# Patient Record
Sex: MALE | Race: WHITE | HISPANIC OR LATINO | Employment: FULL TIME | URBAN - METROPOLITAN AREA
[De-identification: names, ages, dates, MRNs, and addresses within clinical notes are randomized per-mention and may not be internally consistent; named-entity substitution may affect disease eponyms.]

---

## 2023-05-31 LAB
ANION GAP IN SER/PLAS: 10 MMOL/L (ref 10–20)
CALCIUM (MG/DL) IN SER/PLAS: 9.4 MG/DL (ref 8.6–10.3)
CARBON DIOXIDE, TOTAL (MMOL/L) IN SER/PLAS: 29 MMOL/L (ref 21–32)
CHLORIDE (MMOL/L) IN SER/PLAS: 105 MMOL/L (ref 98–107)
CREATININE (MG/DL) IN SER/PLAS: 1.15 MG/DL (ref 0.5–1.3)
GFR MALE: 71 ML/MIN/1.73M2
GLUCOSE (MG/DL) IN SER/PLAS: 117 MG/DL (ref 74–99)
MAGNESIUM (MG/DL) IN SER/PLAS: 2.06 MG/DL (ref 1.6–2.4)
NATRIURETIC PEPTIDE B (PG/ML) IN SER/PLAS: 39 PG/ML (ref 0–99)
POTASSIUM (MMOL/L) IN SER/PLAS: 4.1 MMOL/L (ref 3.5–5.3)
SODIUM (MMOL/L) IN SER/PLAS: 140 MMOL/L (ref 136–145)
UREA NITROGEN (MG/DL) IN SER/PLAS: 27 MG/DL (ref 6–23)

## 2023-08-17 PROBLEM — I25.10 CAD IN NATIVE ARTERY: Status: ACTIVE | Noted: 2023-08-17

## 2023-08-17 PROBLEM — E11.9 DIABETES MELLITUS (MULTI): Status: ACTIVE | Noted: 2023-08-17

## 2023-08-17 PROBLEM — E78.5 DYSLIPIDEMIA: Status: ACTIVE | Noted: 2023-08-17

## 2023-08-17 PROBLEM — M25.662 DECREASED RANGE OF MOTION OF LEFT KNEE: Status: ACTIVE | Noted: 2023-08-17

## 2023-08-17 PROBLEM — I50.42 CHRONIC COMBINED SYSTOLIC AND DIASTOLIC HEART FAILURE, NYHA CLASS 3 (MULTI): Status: ACTIVE | Noted: 2023-08-17

## 2023-08-17 PROBLEM — I10 HTN (HYPERTENSION), BENIGN: Status: ACTIVE | Noted: 2023-08-17

## 2023-08-17 PROBLEM — Z95.810 ICD (IMPLANTABLE CARDIOVERTER-DEFIBRILLATOR) IN PLACE: Status: ACTIVE | Noted: 2023-08-17

## 2023-08-17 PROBLEM — R29.898 DECREASED STRENGTH INVOLVING KNEE JOINT: Status: ACTIVE | Noted: 2023-08-17

## 2023-08-17 RX ORDER — TRAZODONE HYDROCHLORIDE 50 MG/1
1 TABLET ORAL NIGHTLY
COMMUNITY
Start: 2020-11-24

## 2023-08-17 RX ORDER — HYDROCODONE BITARTRATE AND ACETAMINOPHEN 5; 300 MG/1; MG/1
1-2 TABLET ORAL
COMMUNITY
Start: 2021-06-11

## 2023-08-17 RX ORDER — OMEPRAZOLE 40 MG/1
1 CAPSULE, DELAYED RELEASE ORAL DAILY
COMMUNITY
Start: 2021-06-11

## 2023-08-17 RX ORDER — FUROSEMIDE 40 MG/1
1 TABLET ORAL DAILY
COMMUNITY
Start: 2021-06-11 | End: 2023-11-01

## 2023-08-17 RX ORDER — CARVEDILOL 12.5 MG/1
1.5 TABLET ORAL 2 TIMES DAILY
COMMUNITY
Start: 2021-06-11 | End: 2024-03-26

## 2023-08-17 RX ORDER — SILDENAFIL 100 MG/1
1 TABLET, FILM COATED ORAL AS NEEDED
COMMUNITY
Start: 2020-08-17

## 2023-08-17 RX ORDER — HYDROCODONE BITARTRATE AND ACETAMINOPHEN 5; 300 MG/1; MG/1
1 TABLET ORAL EVERY 8 HOURS
COMMUNITY

## 2023-08-17 RX ORDER — TERBINAFINE HYDROCHLORIDE 250 MG/1
1 TABLET ORAL DAILY
COMMUNITY
Start: 2021-06-11

## 2023-08-17 RX ORDER — METFORMIN HYDROCHLORIDE 500 MG/1
1 TABLET ORAL 2 TIMES DAILY
COMMUNITY
Start: 2022-03-18 | End: 2024-05-03 | Stop reason: WASHOUT

## 2023-08-17 RX ORDER — GLIMEPIRIDE 4 MG/1
1 TABLET ORAL DAILY
COMMUNITY
Start: 2021-06-11 | End: 2024-05-03 | Stop reason: WASHOUT

## 2023-08-17 RX ORDER — KETOCONAZOLE 20 MG/G
1 CREAM TOPICAL 2 TIMES DAILY
COMMUNITY
Start: 2021-05-04

## 2023-08-17 RX ORDER — SACUBITRIL AND VALSARTAN 49; 51 MG/1; MG/1
1 TABLET, FILM COATED ORAL 2 TIMES DAILY
COMMUNITY
Start: 2021-06-11 | End: 2024-01-24

## 2023-08-17 RX ORDER — ATORVASTATIN CALCIUM 80 MG/1
1 TABLET, FILM COATED ORAL DAILY
COMMUNITY
Start: 2021-06-11

## 2023-08-17 RX ORDER — BLOOD SUGAR DIAGNOSTIC
STRIP MISCELLANEOUS
COMMUNITY
Start: 2021-01-21

## 2023-08-17 RX ORDER — ASPIRIN 81 MG/1
1 TABLET ORAL DAILY
COMMUNITY
Start: 2021-06-11

## 2023-08-17 RX ORDER — CICLOPIROX 80 MG/ML
1 SOLUTION TOPICAL DAILY
COMMUNITY
Start: 2021-05-04

## 2023-08-17 RX ORDER — SPIRONOLACTONE 25 MG/1
1 TABLET ORAL DAILY
COMMUNITY
Start: 2021-06-11 | End: 2023-11-03

## 2023-10-06 ENCOUNTER — TELEPHONE (OUTPATIENT)
Dept: CARDIOLOGY | Facility: CLINIC | Age: 63
End: 2023-10-06
Payer: COMMERCIAL

## 2023-10-06 NOTE — TELEPHONE ENCOUNTER
Prosper Villafuerte was scheduled for an echo on 10/16, this has been denied by insurance and [facility]  [phone]  case #[id number] was also Denied.   I called patient and left him a VM to let him know the situation, as well as canceled the Echo.

## 2023-10-18 ENCOUNTER — HOSPITAL ENCOUNTER (OUTPATIENT)
Dept: CARDIOLOGY | Facility: HOSPITAL | Age: 63
Discharge: HOME | End: 2023-10-18
Payer: COMMERCIAL

## 2023-10-18 DIAGNOSIS — Z95.810 PRESENCE OF AUTOMATIC (IMPLANTABLE) CARDIAC DEFIBRILLATOR: ICD-10-CM

## 2023-10-18 DIAGNOSIS — Z95.810 PRESENCE OF AUTOMATIC (IMPLANTABLE) CARDIAC DEFIBRILLATOR: Primary | ICD-10-CM

## 2023-10-18 DIAGNOSIS — I50.1 LEFT VENTRICULAR FAILURE, UNSPECIFIED (MULTI): ICD-10-CM

## 2023-10-18 PROCEDURE — 93296 REM INTERROG EVL PM/IDS: CPT

## 2023-10-23 DIAGNOSIS — I50.42 CHRONIC COMBINED SYSTOLIC AND DIASTOLIC HEART FAILURE, NYHA CLASS 3 (MULTI): Primary | ICD-10-CM

## 2023-10-28 NOTE — PROGRESS NOTES
[facility]    Patient Name: Prosper Villafuerte  Patient : 1960      Scribe Attestation  By signing my name below, I, Angelika Hawk   attest that this documentation has been prepared under the direction and in the presence of Christian Montano DO.       Reason for visit:  This is a 62-year-old male here for follow-up regarding his history chronic systolic and diastolic heart failure with an ejection fraction of 30 to 35% now improved to 55%, ICD, moderate nonobstructive coronary artery disease as seen on cardiac catheterization done in 2021, hypertension, dyslipidemia, diabetes mellitus.      HPI:  This is a 62-year-old male here for follow-up regarding his history of chronic systolic and diastolic heart failure with an ejection fraction of 30 to 35% now improved to 55%, ICD, moderate nonobstructive coronary artery disease as seen on cardiac catheterization done in 2021, hypertension, dyslipidemia, diabetes mellitus.  The patient was last evaluated by me in May 2023.  At that visit I ordered blood work including BMP/BNP/magnesium, ordered additional blood work including CMP/lipid/magnesium/CBC/BNP to be drawn in 6 months, ordered an echocardiogram to be completed in 6 months, and asked the patient to follow-up in 6 months and sooner if necessary.  BMP done in May 2023 showed normal serum sodium and potassium with a serum creatinine of 1.15, serum magnesium was 2.06, BNP was 39.  Hemoglobin A1c done in 2023 was 7.1%.  Echocardiogram done on 10/31/23 showed low normal left ventricular systolic function with a 55% estimated ejection fraction. ECG done today showed sinus rhythm with a heart rate of 67 bpm.  The patient reports fatigue but denies any chest pain, shortness of breath, palpitations or lightheadedness. He states that he does not monitor his blood pressure at home. He states that he recently had blood works but results are not available for  review at this time. He states that he takes all of his medications as prescribed. During my exam, he was resting comfortably on the exam table.             Assessment/Plan:   1. History of chronic systolic and diastolic heart failure  The patient has a history of chronic systolic and diastolic heart failure with an ejection fraction of 30 to 35% now improved to 55%.  Echocardiogram done on 10/31/23 showed low normal left ventricular systolic function with a 55% estimated ejection fraction.  He does not appear significantly volume overloaded on exam today.  He should continue his current cardiac medications.  Recent lab works as noted in the HPI.   Lab works as noted below will be done in 6 months prior to his next visit.  I discussed with him the importance of following a low-sodium heart healthy diet as well as weight loss.   Follow up in 6 months and sooner if necessary.      2. ICD  The patient has an ICD implanted in November 2021.  He should continue to follow with the device clinic.     3. Coronary artery disease  The patient has a history of moderate nonobstructive coronary artery disease as seen on cardiac catheterization done in June 2021.  ECG done today showed sinus rhythm with a heart rate of 67 bpm.    He denies anginal chest discomfort.  Blood pressure is decreased to 88/62 on exam today.  I will discontinue spironolactone and decrease furosemide to 20 mg daily.   He should continue his other antihypertensive medications and antiplatelet therapy.  Echocardiogram done on 10/31/23 showed low normal left ventricular systolic function with a 55% estimated ejection fraction.  Lipid panel done in November 2022 showed an LDL cholesterol 55 and triglycerides of 164 while on atorvastatin 80 mg daily.   Recent lab works as noted in the HPI. We ill try to obtain result of blood works done recently.  Lab works as noted below will be done in 6 months prior to his next visit.  Please see lifestyle recommendations  below.  Follow up in 6 months and sooner if necessary.      4. Hypertension  The patient has a history of hypertension and his blood pressure is decreased to 88/62 on exam today.  He denies dizziness or lightheadedness.   I will discontinue spironolactone and decrease furosemide to 20 mg daily.   He should continue his other antihypertensive medications.     5. Dyslipidemia  Lipid panel done in November 2022 showed an LDL cholesterol 55 and triglycerides of 164 while on atorvastatin 80 mg daily.   I will update lipid panel as noted below.  Please see lifestyle recommendations below.     6. Diabetes mellitus  Hemoglobin A1c done in July 2023 was 7.1%.   Medication management per primary care provider.    7. Fatigue  The patient reports fatigue as noted in the HPI.  His fatigue could be due to low blood pressure. Blood pressure at the clinic today was 88/62.   I will discontinue spironolactone and decrease furosemide to 20 mg daily.   He should continue his other antihypertensive medications.  He should monitor his blood pressure at home.      Orders:   Discontinue spironolactone and decrease furosemide to 20 mg daily.   Obtain results of recent blood works.  BMP/magnesium in 6 months,   Follow-up in 6 months.         Lifestyle Recommendations  I recommend a whole-food plant-based diet, an eating pattern that encourages the consumption of unrefined plant foods (such as fruits, vegetables, tubers, whole grains, legumes, nuts and seeds) and discourages meats, dairy products, eggs and processed foods.     The AHA/ACC recommends that the patient consume a dietary pattern that emphasizes intake of vegetables, fruits, and whole grains; includes low-fat dairy products, poultry, fish, legumes, non-tropical vegetable oils, and nuts; and limits intake of sodium, sweets, sugar-sweetened beverages, and red meats.  Adapt this dietary pattern to appropriate calorie requirements (a 500-750 kcal/day deficit to loose weight), personal  and cultural food preferences, and nutrition therapy for other medical conditions (including diabetes).  Achieve this pattern by following plans such as the [place] Mediterranean, DASH dietary pattern, or AHA diet.     Engage in 2 hours and 30 minutes per week of moderate-intensity physical activity, or 1 hour and 15 minutes (75 minutes) per week of vigorous-intensity aerobic physical activity, or an equivalent combination of moderate and vigorous-intensity aerobic physical activity. Aerobic activity should be performed in episodes of at least 10 minutes preferably spread throughout the week.     Adhering to a heart healthy diet, regular exercise habits, avoidance of tobacco products, and maintenance of a healthy weight are crucial components of their heart disease risk reduction.     Any positive review of systems not specifically addressed in the office visit today should be evaluated and treated by the patients primary care physician or in an emergency department if necessary     Patient was notified that results from ordered tests will be called to the patient if it changes current management; it will otherwise be discussed at a future appointment and available on  [facility].     Thank you for allowing me to participate in the care of this patient.        This document was generated using the assistance of voice recognition software. If there are any errors of spelling, grammar, syntax, or meaning; please feel free to contact me directly for clarification.    Past Medical History:  He has no past medical history on file.    Past Surgical History:  He has a past surgical history that includes Other surgical history (06/11/2021).      Social History:  He has no history on file for tobacco use, alcohol use, and drug use.    Family History:  Family History   Problem Relation Name Age of Onset    No Known Problems Mother      No Known Problems Father          Allergies:  Patient has no known allergies.    Outpatient  "Medications:  Current Outpatient Medications   Medication Instructions    aspirin 81 mg EC tablet 1 tablet, oral, Daily    atorvastatin (Lipitor) 80 mg tablet 1 tablet, oral, Daily    carvedilol (Coreg) 12.5 mg tablet 1.5 tablets, oral, 2 times daily    ciclopirox (Penlac) 8 % solution 1 Application, Topical, Daily, nails    empagliflozin (Jardiance) 10 mg 1 tablet, oral, Daily    furosemide (Lasix) 40 mg tablet 1 tablet, oral, Daily    glimepiride (Amaryl) 4 mg tablet 1 tablet, oral, Daily    HYDROcodone-acetaminophen (Vicodin) 5-300 mg tablet 1-2 tablets, oral, 4 TO 6 HOURS AS NEEDED FOR PAIN.    HYDROcodone-acetaminophen (Vicodin) 5-300 mg tablet 1 tablet, oral, Every 8 hours    ketoconazole (NIZOral) 2 % cream 1 Application, Topical, 2 times daily    metFORMIN (Glucophage) 500 mg tablet 1 tablet, oral, 2 times daily    omeprazole (PriLOSEC) 40 mg DR capsule 1 capsule, oral, Daily    OneTouch Ultra Test strip use to test blood sugar twice a day as instructed    sacubitriL-valsartan (Entresto) 49-51 mg tablet 1 tablet, oral, 2 times daily    sildenafil (Viagra) 100 mg tablet 1 tablet, oral, As needed, 30 TO 60 MINUTES BEFORE SEXUAL INTERCOURSE    spironolactone (Aldactone) 25 mg tablet 1 tablet, oral, Daily    terbinafine (LamISIL) 250 mg tablet 1 tablet, oral, Daily    traZODone (Desyrel) 50 mg tablet 1 tablet, oral, Nightly        ROS:  A 14 point review of systems was done and is negative other than as stated in HPI    Vitals:      7/30/2021    10:56 AM 9/17/2021    10:56 AM 10/25/2021     8:30 AM 1/10/2022     9:21 AM 3/18/2022    11:24 AM 11/4/2022     2:23 PM 5/31/2023     1:57 PM   Vitals   Systolic 122 108 132 120 124 111 132   Diastolic 64 62 76 60 64 71 78   Heart Rate 86 78 78 84 68 74 57   Resp 18 18 16 16 16 16    Height (in) 1.803 m (5' 11\") 1.803 m (5' 11\") 1.803 m (5' 11\") 1.803 m (5' 11\") 1.803 m (5' 11\") 1.803 m (5' 11\") 1.803 m (5' 11\")   Weight (lb) 211 211.38 207 216.56 212 214 214   BMI 29.43 " kg/m2 29.48 kg/m2 28.87 kg/m2 30.2 kg/m2 29.57 kg/m2 29.85 kg/m2 29.85 kg/m2   BSA (m2) 2.19 m2 2.19 m2 2.17 m2 2.22 m2 2.2 m2 2.21 m2 2.21 m2        Physical Exam:     Constitutional: Cooperative, in no acute distress, alert, appears stated age.  Skin: Skin color, texture, turgor normal. No rashes or lesions.  Head: Normocephalic. No masses, lesions, tenderness or abnormalities  Eyes: Extraocular movements are grossly intact.  Mouth and throat: Mucous membranes moist  Neck: Neck supple, no carotid bruits, no JVD  Respiratory: Lungs clear to auscultation, no wheezing or rhonchi, no use of accessory muscles  Chest wall: ICD, normal excursion with respiration  Cardiovascular: Regular rhythm without murmur, gallop, or rubs  Gastrointestinal: Abdomen soft, nontender. Bowel sounds normal.  Musculoskeletal: Strength equal in upper extremities  Extremities: No pitting edema  Neurologic: Sensation grossly intact, alert and oriented x3    Intake/Output:   No intake/output data recorded.    Outpatient Medications  Current Outpatient Medications on File Prior to Visit   Medication Sig Dispense Refill    aspirin 81 mg EC tablet Take 1 tablet (81 mg) by mouth once daily.      atorvastatin (Lipitor) 80 mg tablet Take 1 tablet (80 mg) by mouth once daily.      carvedilol (Coreg) 12.5 mg tablet Take 1.5 tablets (18.75 mg) by mouth twice a day.      ciclopirox (Penlac) 8 % solution Apply 1 Application topically once daily. nails      empagliflozin (Jardiance) 10 mg Take 1 tablet (10 mg) by mouth once daily.      furosemide (Lasix) 40 mg tablet Take 1 tablet (40 mg) by mouth once daily.      glimepiride (Amaryl) 4 mg tablet Take 1 tablet (4 mg) by mouth once daily.      HYDROcodone-acetaminophen (Vicodin) 5-300 mg tablet Take 1-2 tablets by mouth. 4 TO 6 HOURS AS NEEDED FOR PAIN.      HYDROcodone-acetaminophen (Vicodin) 5-300 mg tablet Take 1 tablet by mouth every 8 hours.      ketoconazole (NIZOral) 2 % cream Apply 1 Application  topically 2 times a day.      metFORMIN (Glucophage) 500 mg tablet Take 1 tablet (500 mg) by mouth 2 times a day.      omeprazole (PriLOSEC) 40 mg DR capsule Take 1 capsule (40 mg) by mouth once daily.      OneTouch Ultra Test strip use to test blood sugar twice a day as instructed      sacubitriL-valsartan (Entresto) 49-51 mg tablet Take 1 tablet by mouth 2 times a day.      sildenafil (Viagra) 100 mg tablet Take 1 tablet (100 mg) by mouth if needed. 30 TO 60 MINUTES BEFORE SEXUAL INTERCOURSE      spironolactone (Aldactone) 25 mg tablet Take 1 tablet (25 mg) by mouth once daily.      terbinafine (LamISIL) 250 mg tablet Take 1 tablet (250 mg) by mouth once daily.      traZODone (Desyrel) 50 mg tablet Take 1 tablet (50 mg) by mouth once daily at bedtime.       No current facility-administered medications on file prior to visit.       Labs: (past 26 weeks)  Recent Results (from the past 4368 hour(s))   B-Type Natriuretic Peptide    Collection Time: 05/31/23  2:41 PM   Result Value Ref Range    BNP 39 0 - 99 pg/mL   Basic Metabolic Panel    Collection Time: 05/31/23  2:41 PM   Result Value Ref Range    Glucose 117 (H) 74 - 99 mg/dL    Sodium 140 136 - 145 mmol/L    Potassium 4.1 3.5 - 5.3 mmol/L    Chloride 105 98 - 107 mmol/L    Bicarbonate 29 21 - 32 mmol/L    Anion Gap 10 10 - 20 mmol/L    Urea Nitrogen 27 (H) 6 - 23 mg/dL    Creatinine 1.15 0.50 - 1.30 mg/dL    GFR MALE 71 >90 mL/min/1.73m2    Calcium 9.4 8.6 - 10.3 mg/dL   Magnesium    Collection Time: 05/31/23  2:41 PM   Result Value Ref Range    Magnesium 2.06 1.60 - 2.40 mg/dL   POCT GLYCOSYLATED HEMOGLOBIN (HGB A1C)    Collection Time: 07/20/23 10:30 AM   Result Value Ref Range    POCT Hemoglobin A1C 7.1 (A) 4.2 - 5.6 %       ECG  No results found for this or any previous visit (from the past 4464 hour(s)).    Echocardiogram  No results found for this or any previous visit from the past 1095 days.      CV Studies:  EKG:No results found for this or any previous  visit (from the past 4464 hour(s)).  Echocardiogram:   Echocardiogram     Narrative  [address] [address]  Phone [phone] Fax [phone]    TRANSTHORACIC ECHOCARDIOGRAM REPORT      Patient Name:     EMMETT JOHNSONEGO Reading Physician:   [id number] Johnson Oh MD  Study Date:       9/3/2021        Referring Physician: [id number] WADE  MRN/PID:          [id number]        PCP:  Accession/Order#: [id number]    Department Location: [facility] Echo Lab  YOB: 1960       Fellow:  Gender:           M               Nurse:  Admit Date:                       Sonographer:         Lucero Guillory RDCS  Admission Status: Outpatient      Additional Staff:  Height:           180.34 cm       CC Report to:  Weight:           95.71 kg        Study Type:          Echocardiogram  BSA:              2.16 m2  Blood Pressure: 112 /64 mmHg    Diagnosis/ICD: I25.10-Atherosclerotic heart disease of native coronary artery  without angina pectoris; I50.42-Chronic combined systolic  (congestive) and diastolic (congestive) heart failure (CHF);  I10-Essential (primary) hypertension  Indication:    Limited echo for LVF  Procedure/CPT: Echo Limited-20454; Doppler Limited-36475  Study Detail: The following Echo studies were performed: 2D and Doppler.      PHYSICIAN INTERPRETATION:  Left Ventricle: The left ventricular systolic function is moderately decreased, with an estimated ejection fraction of 30-35%. There is global hypokinesis of the left ventricle with minor regional variations. The left ventricular cavity size is not assessed. Left ventricular diastolic filling was not assessed.  Left Atrium: The left atrium was not assessed.  Right Ventricle: The right ventricle was not assessed. Right ventricular systolic function not assessed.  Right Atrium: The right atrium was not assessed.  Aortic Valve: The aortic valve was not assessed. Aortic valve regurgitation was not  assessed.  Mitral Valve: The mitral valve was not assessed. Mitral valve regurgitation was not assessed.  Tricuspid Valve: The tricuspid valve was not assessed. Tricuspid regurgitation was not assessed.  Pulmonic Valve: The pulmonic valve was not assessed. The pulmonic valve regurgitation was not assessed.  Pericardium: Pericardial effusion was not assessed.  Aorta: The aortic root was not assessed.      CONCLUSIONS:  1. The left ventricular systolic function is moderately decreased with a 30-35% estimated ejection fraction.  2. There is global hypokinesis of the left ventricle with minor regional variations.    QUANTITATIVE DATA SUMMARY:  2D MEASUREMENTS:  Normal Ranges:  IVSd:          1.20 cm    (0.6-1.1cm)  LVPWd:         1.20 cm    (0.6-1.1cm)  LVIDd:         6.00 cm    (3.9-5.9cm)  LVIDs:         5.20 cm  LV Mass Index: 145.6 g/m2  LV % FS        13.3 %    LA VOLUME:  Normal Ranges:  LA Vol A4C:        67.5 ml   (22+/-6mL/m2)  LA Vol Index A4C:  31.3ml/m2  LA Area A4C:       20.9 cm2  LA Major Axis A4C: 5.5 cm  LA Vol A4C:        68.0 ml    RA VOLUME BY A/L METHOD:  Normal Ranges:  RA Area A4C: 13.4 cm2    LV SYSTOLIC FUNCTION BY 2D PLANIMETRY (MOD):  Normal Ranges:  EF-A4C View: 35.5 % (>55%)  EF-A2C View: 36.4 %  EF-Biplane:  36.1 %    LV DIASTOLIC FUNCTION:  Normal Ranges:  MV Peak E:        0.51 m/s    (0.7-1.2 m/s)  MV Peak A:        0.90 m/s    (0.42-0.7 m/s)  E/A Ratio:        0.57        (1.0-2.2)  MV e'             0.06 m/s    (>8.0)  MV lateral e'     0.07 m/s  MV medial e'      0.05 m/s  MV A Dur:         124.00 msec  E/e' Ratio:       8.55        (<8.0)  LV IVRT:          106 msec    (<100ms)  PulmV A Revs Dur: 132.00 msec    MITRAL VALVE:  Normal Ranges:  MV DT: 203 msec (150-240msec)    AORTIC VALVE:  Normal Ranges:  LVOT Max Jorge:  0.71 m/s (<1.1m/s)  LVOT VTI:      16.10 cm  LVOT Diameter: 2.20 cm  (1.8-2.4cm)    RIGHT VENTRICLE:  RV 1 3.2 cm  RV 2 1.7 cm  RV 3 9.0 cm    TRICUSPID  VALVE/RVSP:  Normal Ranges:  IVC Diam: 1.48 cm    Pulmonary Veins:  PulmV A Revs Dur: 132.00 msec      [id number] Johnson Oh MD  Electronically signed on 9/4/2021 at 8:58:48 AM         Final     Stress Testing IMVERONICA([id number]:1:1825):   NM CARDIAC STRESS REST (MYOCARDIAL PERFUSION MIBI) 05/31/2021    Narrative  MRN: [id number]  Patient Name: EMMETT ALTAMIRANO    STUDY:  CARDIAC STRESS/REST INJECTION; PART 2 STRESS OR REST (NO CHARGE);  CARDIAC STRESS/REST (MYOCARDIAL PERFUSION/MIBI);  5/31/2021 11:36 am    INDICATION:  elevated troponin/SOB.    COMPARISON:  None.    ACCESSION NUMBER(S):  [id number]; [id number]; [id number]    ORDERING CLINICIAN:  DOUG WADE    TECHNIQUE:  DIVISION OF NUCLEAR MEDICINE  PHARMACOLOGIC STRESS MYOCARDIAL PERFUSION SCAN, ONE DAY PROTOCOL    The patient received an intravenous dose of  11.8 mCi of Tc-99m  tetrofosmin and resting emission tomographic (SPECT) images of the  myocardium were acquired. The patient then received an intravenous  infusion of 0.4mg regadenoson (Lexiscan) followed by an additional  dose of  33.4 mCi of Tc-99m tetrofosmin. Stress phase SPECT images of  the myocardium were then acquired. These included ECG-gated images to  assess and quantify ventricular function.    FINDINGS:  There is a small fixed defect in the anteroseptal and apical  segments. No evidence of ischemia.    Calculated ejection fraction of 27% with global hypokinesis and  akinesis in the distal anterior, apical, septal, and inferior walls.    Impression  1. There is a small fixed defect in the anteroseptal and apical  segments. There is a low probability of ischemia  2. Calculated ejection fraction 27% with global hypokinesis and  akinesis in the distal anterior, apical, septal, and inferior walls.    Cardiac Catheterization:   Adult Cath     [facility], Cath Lab  [address] [place] [address]  Phone [phone] Fax [phone]    Cardiovascular Catheterization  Report    Patient Name:     EMMETT ALTAMIRANO Performing Physician: [id number] Walt Gallego MD  Study Date:       6/1/2021        Verifying Physician:  [id number] Walt Gallego MD  MRN/PID:          [id number]        Cardiologist:  Accession/Order#: [id number]       Referring Physician:  [id number] DOUG NGHIA SHERI  YOB: 1960       Referring Physician:  Gender:           M               Referring Physician:      Study:            Left and Right Heart Catheterization  Additional Study: IVUS - Intravascular Ultrasound  Additional Study: FFR - Fractional Flow Reserve      Indications:  EMMETT ALTAMIRANO is a 61 year old male who presents with hypertension, dyslipidemia and diabetes. Cardiomyopathy, with a chest pain assessment of atypical angina. Study performed as an urgent cath procedure. Heart failure.    Medical History:  Stress test performed: Yes. Stress test type: Stress Nuclear. Stress result: Negative. CTA performed: No. James accessed: No. LVEF Assessed: Yes. LVEF = 25%.    Procedure Description:  After infiltration with 2% Lidocaine, the right radial artery was cannulated with a modified Seldinger technique. Subsequently a 6 Vietnamese sheath was placed retrograde in the right radial artery. Selective coronary catheterization was performed using a 6 Fr catheter(s) exchanged over a guide wire to cannulate the coronary arteries. A 6 tip catheter was used for left coronary injections. A 6 tip catheter was used for right coronary injections.  Additional catheter(s) used to visualize the coronary arteries were: Marisa. Multiple injections of contrast were made into the left and right coronary arteries with angiograms recorded in multiple projections. Cardiac output was calculated via the Mi method. After completion of the procedure, the arterial sheath was pulled and a TR Band Radial Compression Device was utilized to obtain patent hemostasis.    Coronary Angiography:  The coronary circulation is right  dominant.    Coronary Angiography Comments:  Given the evidence of borderline significant ostial LCx lesion, I proceeded with IVUS to evaluate for lesion significant before dedication to LM stenting. IVUS came back showing no evidence of significant ostial disease with a diameter of 3.0mm at the ostium and area of 11mm2. Thus, iFR was used for confirmation that showed evidence of negative ischemia with values of 0.93 repeated 3 times for confirmation, thus no PCI was performed.      Left Main Coronary Artery:  The left main coronary artery is a normal caliber vessel. The left main arises normally from the left coronary sinus of Valsalva and bifurcates into the LAD and circumflex coronary arteries. The left main coronary artery showed no significant disease or stenosis greater than 30%.    Left Anterior Descending Coronary Artery Distribution:  The left anterior descending coronary artery is a normal caliber vessel. The LAD arises normally from the left main coronary artery. The LAD demonstrated no significant disease or stenosis greater than 30%.    Circumflex Coronary Artery Distribution:  The circumflex coronary artery is a normal caliber vessel. The circumflex arises normally from the left main coronary artery and terminates in the AV groove. The circumflex revealed moderate atherosclerotic disease. The ostial circumflex coronary artery showed 50% stenosis. This lesion was eccentric. Negative iFR of ostial LCx (repeated 3 times) at 0.93 and IVUS showed area of 11mm2 with a max diameter of 3.0mm at the ostium.    Right Heart Catheterization:  Cardiac output was calculated via the Mi method. Normal filling pressures. Normal ventricular filling pressure. Cardiac output is normal. Preserved cardiac output at rest. No pulmonary vascular resistance. Normal cardiac output without evidence of volume overload and normal right sided filling pressures, mildly elevated left sided filling pressures.    Right Coronary Artery  Distribution:    The right coronary artery is a normal caliber vessel. The RCA arises normally from the right sinus of Valsalva. The RCA showed no significant disease or stenosis greater than 30%. The mid right coronary artery showed 40% stenosis.    Coronary Lesion Summary:  Vessel       Stenosis   Vessel Segment  Circumflex 50% stenosis     ostial  RCA        40% stenosis      mid    Complications:  No in-lab complications observed.    Cardiac Cath Transition of Care Summary:  Post Procedure Diagnosis: Mild CAD.  Findings:                 Non obstructive CAD with negative IVUS and RFR for  ischemia.  Blood Loss:               Estimated blood loss during the procedure was 0 mls.  Specimens Removed:        Number of specimen(s) removed: none.      Recommendations:  Management of HF per primary team.    ____________________________________________________________________________________  CONCLUSIONS:  1. Negative iFR of ostial LCx (repeated 3 times) at 0.93 and IVUS showed area of 11mm2 with a max diameter of 3.0mm at the ostium.  2. Evidence of normal CI/CO and normal right sided filling pressures with mildly elevated left sided filling pressures.  3. Continue optimal therapy for NICM including adequate blood pressure control.  4. No indication for revascularization at the current time.    ____________________________________________________________________________________  CPT Codes:  Right & Left Heart Cath w/ventriculography/Coronary angio (RHC)([facility])-94249; Moderate Sedation Services initial 15 minutes patient >5 years-92491; Moderate Sedation Services 1st additional 15 minutes patient >5 years-48721; IVUS, Left Circumflex initial Vessel (PCI)-54259.LC; FFR, initial Vessel (PCI)-22424    ICD 10 Codes:  I42.0-Dilated cardiomyopathy    [id number] Walt Gallego MD  Performing Physician  Electronically signed by [id number] Walt Gallego MD on 6/1/2021 at 12:20:39 PM      cc Report to: [id number] DOUG WADE           Final   No  results found for this or any previous visit from the past 3650 days.     Cardiac Scoring: No results found for this or any previous visit from the past 1825 days.    AAA : No results found for this or any previous visit from the past 1825 days.    OTHER: No results found for this or any previous visit from the past 1825 days.    LAST IMAGING RESULTS  ELECTROCARDIOGRAM RHYTHM STRIP  Ordered by an unspecified provider.      Problem List Items Addressed This Visit       CAD in native artery    Relevant Orders    ECG 12 Lead (Completed)    Chronic combined systolic and diastolic heart failure, NYHA class 3 (CMS/HCC) - Primary    Relevant Orders    ECG 12 Lead (Completed)    Diabetes mellitus (CMS/HCC)    Relevant Orders    ECG 12 Lead (Completed)    Dyslipidemia    Relevant Orders    ECG 12 Lead (Completed)    HTN (hypertension), benign    Relevant Orders    ECG 12 Lead (Completed)    ICD (implantable cardioverter-defibrillator) in place    Relevant Orders    ECG 12 Lead (Completed)    Other fatigue             Christian Montano DO, FACC, FACOI

## 2023-10-31 ENCOUNTER — HOSPITAL ENCOUNTER (OUTPATIENT)
Dept: CARDIOLOGY | Facility: HOSPITAL | Age: 63
Discharge: HOME | End: 2023-10-31
Payer: COMMERCIAL

## 2023-10-31 DIAGNOSIS — I50.42 CHRONIC COMBINED SYSTOLIC AND DIASTOLIC HEART FAILURE, NYHA CLASS 3 (MULTI): Primary | ICD-10-CM

## 2023-10-31 DIAGNOSIS — E78.5 HYPERLIPIDEMIA, UNSPECIFIED: ICD-10-CM

## 2023-10-31 DIAGNOSIS — I50.42 CHRONIC COMBINED SYSTOLIC (CONGESTIVE) AND DIASTOLIC (CONGESTIVE) HEART FAILURE (MULTI): ICD-10-CM

## 2023-10-31 DIAGNOSIS — I25.10 ATHEROSCLEROTIC HEART DISEASE OF NATIVE CORONARY ARTERY WITHOUT ANGINA PECTORIS: ICD-10-CM

## 2023-10-31 DIAGNOSIS — I10 ESSENTIAL (PRIMARY) HYPERTENSION: ICD-10-CM

## 2023-10-31 DIAGNOSIS — E11.9 TYPE 2 DIABETES MELLITUS WITHOUT COMPLICATIONS (MULTI): ICD-10-CM

## 2023-10-31 PROCEDURE — 93306 TTE W/DOPPLER COMPLETE: CPT | Performed by: INTERNAL MEDICINE

## 2023-10-31 PROCEDURE — 93306 TTE W/DOPPLER COMPLETE: CPT

## 2023-10-31 NOTE — TELEPHONE ENCOUNTER
10/31/23  1723  Called patient; no answer. Left voice message for patient to have labs done prior to appt with Dr. Montano

## 2023-11-01 ENCOUNTER — LAB (OUTPATIENT)
Dept: LAB | Facility: LAB | Age: 63
End: 2023-11-01
Payer: COMMERCIAL

## 2023-11-01 DIAGNOSIS — I50.42 CHRONIC COMBINED SYSTOLIC AND DIASTOLIC HEART FAILURE, NYHA CLASS 3 (MULTI): ICD-10-CM

## 2023-11-01 LAB — EJECTION FRACTION APICAL 4 CHAMBER: 61.6

## 2023-11-01 PROCEDURE — 36415 COLL VENOUS BLD VENIPUNCTURE: CPT

## 2023-11-01 RX ORDER — FUROSEMIDE 40 MG/1
40 TABLET ORAL DAILY
Qty: 90 TABLET | Refills: 0 | Status: SHIPPED | OUTPATIENT
Start: 2023-11-01 | End: 2023-11-03 | Stop reason: SDUPTHER

## 2023-11-03 ENCOUNTER — OFFICE VISIT (OUTPATIENT)
Dept: CARDIOLOGY | Facility: CLINIC | Age: 63
End: 2023-11-03
Payer: COMMERCIAL

## 2023-11-03 VITALS
BODY MASS INDEX: 27.44 KG/M2 | WEIGHT: 196 LBS | HEIGHT: 71 IN | DIASTOLIC BLOOD PRESSURE: 62 MMHG | SYSTOLIC BLOOD PRESSURE: 88 MMHG | HEART RATE: 67 BPM

## 2023-11-03 DIAGNOSIS — E08.00 DIABETES MELLITUS DUE TO UNDERLYING CONDITION WITH HYPEROSMOLARITY WITHOUT COMA, WITHOUT LONG-TERM CURRENT USE OF INSULIN (MULTI): ICD-10-CM

## 2023-11-03 DIAGNOSIS — I50.42 CHRONIC COMBINED SYSTOLIC AND DIASTOLIC HEART FAILURE, NYHA CLASS 3 (MULTI): Primary | ICD-10-CM

## 2023-11-03 DIAGNOSIS — Z95.810 ICD (IMPLANTABLE CARDIOVERTER-DEFIBRILLATOR) IN PLACE: ICD-10-CM

## 2023-11-03 DIAGNOSIS — E78.5 DYSLIPIDEMIA: ICD-10-CM

## 2023-11-03 DIAGNOSIS — I10 HTN (HYPERTENSION), BENIGN: ICD-10-CM

## 2023-11-03 DIAGNOSIS — R53.83 OTHER FATIGUE: ICD-10-CM

## 2023-11-03 DIAGNOSIS — I25.10 CAD IN NATIVE ARTERY: ICD-10-CM

## 2023-11-03 PROCEDURE — 99214 OFFICE O/P EST MOD 30 MIN: CPT | Performed by: INTERNAL MEDICINE

## 2023-11-03 PROCEDURE — 93000 ELECTROCARDIOGRAM COMPLETE: CPT | Performed by: INTERNAL MEDICINE

## 2023-11-03 PROCEDURE — 3078F DIAST BP <80 MM HG: CPT | Performed by: INTERNAL MEDICINE

## 2023-11-03 PROCEDURE — 1036F TOBACCO NON-USER: CPT | Performed by: INTERNAL MEDICINE

## 2023-11-03 PROCEDURE — 3074F SYST BP LT 130 MM HG: CPT | Performed by: INTERNAL MEDICINE

## 2023-11-03 RX ORDER — FUROSEMIDE 40 MG/1
20 TABLET ORAL DAILY
Qty: 90 TABLET | Refills: 1 | Status: SHIPPED | OUTPATIENT
Start: 2023-11-03 | End: 2024-01-02 | Stop reason: SDUPTHER

## 2023-11-03 RX ORDER — CETIRIZINE HYDROCHLORIDE 10 MG/1
10 TABLET ORAL
COMMUNITY
Start: 2023-07-20 | End: 2024-07-14

## 2023-11-03 RX ORDER — EMPAGLIFLOZIN 25 MG/1
25 TABLET, FILM COATED ORAL
COMMUNITY
Start: 2023-10-20

## 2024-01-02 DIAGNOSIS — I50.42 CHRONIC COMBINED SYSTOLIC AND DIASTOLIC HEART FAILURE, NYHA CLASS 3 (MULTI): ICD-10-CM

## 2024-01-02 RX ORDER — FUROSEMIDE 40 MG/1
20 TABLET ORAL DAILY
Qty: 90 TABLET | Refills: 1 | Status: SHIPPED | OUTPATIENT
Start: 2024-01-02

## 2024-01-23 DIAGNOSIS — I50.42 CHRONIC COMBINED SYSTOLIC AND DIASTOLIC HEART FAILURE, NYHA CLASS 3 (MULTI): Primary | ICD-10-CM

## 2024-01-24 RX ORDER — SACUBITRIL AND VALSARTAN 49; 51 MG/1; MG/1
1 TABLET, FILM COATED ORAL 2 TIMES DAILY
Qty: 180 TABLET | Refills: 1 | Status: SHIPPED | OUTPATIENT
Start: 2024-01-24

## 2024-01-31 ENCOUNTER — HOSPITAL ENCOUNTER (OUTPATIENT)
Dept: CARDIOLOGY | Facility: HOSPITAL | Age: 64
Discharge: HOME | End: 2024-01-31
Payer: COMMERCIAL

## 2024-01-31 DIAGNOSIS — I50.42 CHRONIC COMBINED SYSTOLIC (CONGESTIVE) AND DIASTOLIC (CONGESTIVE) HEART FAILURE (MULTI): ICD-10-CM

## 2024-01-31 DIAGNOSIS — Z95.810 PRESENCE OF AUTOMATIC (IMPLANTABLE) CARDIAC DEFIBRILLATOR: Primary | ICD-10-CM

## 2024-01-31 DIAGNOSIS — Z95.810 PRESENCE OF AUTOMATIC (IMPLANTABLE) CARDIAC DEFIBRILLATOR: ICD-10-CM

## 2024-01-31 PROCEDURE — 93295 DEV INTERROG REMOTE 1/2/MLT: CPT | Performed by: INTERNAL MEDICINE

## 2024-01-31 PROCEDURE — 93296 REM INTERROG EVL PM/IDS: CPT

## 2024-03-14 ENCOUNTER — HOSPITAL ENCOUNTER (OUTPATIENT)
Dept: CARDIOLOGY | Facility: HOSPITAL | Age: 64
Discharge: HOME | End: 2024-03-14
Payer: COMMERCIAL

## 2024-03-14 DIAGNOSIS — Z95.810 PRESENCE OF AUTOMATIC (IMPLANTABLE) CARDIAC DEFIBRILLATOR: ICD-10-CM

## 2024-03-19 DIAGNOSIS — I25.10 CAD IN NATIVE ARTERY: Primary | ICD-10-CM

## 2024-03-26 RX ORDER — SPIRONOLACTONE 25 MG/1
25 TABLET ORAL DAILY
Qty: 90 TABLET | Refills: 0 | OUTPATIENT
Start: 2024-03-26

## 2024-03-26 RX ORDER — CARVEDILOL 12.5 MG/1
TABLET ORAL
Qty: 270 TABLET | Refills: 3 | Status: SHIPPED | OUTPATIENT
Start: 2024-03-26 | End: 2024-05-03

## 2024-04-04 DIAGNOSIS — I25.10 CAD IN NATIVE ARTERY: ICD-10-CM

## 2024-04-10 RX ORDER — CARVEDILOL 12.5 MG/1
TABLET ORAL
Qty: 270 TABLET | Refills: 0 | OUTPATIENT
Start: 2024-04-10

## 2024-04-28 PROBLEM — Z86.79 HISTORY OF CARDIOMYOPATHY: Status: ACTIVE | Noted: 2024-04-28

## 2024-04-28 NOTE — PROGRESS NOTES
[facility] Card[facility]    Patient Name: Prosper Villafuerte  Patient : 1960      Scribe Attestation  By signing my name below, I, Angelika Hawk   attest that this documentation has been prepared under the direction and in the presence of Christian Montano DO.      Reason for visit:  This is a 63-year-old male here for follow-up regarding his history of cardiomyopathy with ejection fraction david of 30% now improved to 55%, ICD implanted in 2021, moderate nonobstructive coronary artery disease as seen on cardiac catheterization done in 2021, hypertension, dyslipidemia, diabetes mellitus.     HPI:  This is a 63-year-old male here for follow-up regarding his history of cardiomyopathy with ejection fraction david of 30% now improved to 55%, ICD implanted in 2021, moderate nonobstructive coronary artery disease as seen on cardiac catheterization done in 2021, hypertension, dyslipidemia, diabetes mellitus.  Patient was last evaluated by me in 2023.  At that visit he reported fatigue and low blood pressures.  I discontinued spironolactone, reduced furosemide to 20 mg daily, ordered blood work including BMP/magnesium in 6 months, and asked the patient to follow-up in 6 months and sooner if necessary.  Outside lipid panel done in 2023 showed LDL cholesterol 42 and triglycerides of 239 on atorvastatin 80 mg daily.  CMP done in 2023 showed normal serum sodium and potassium with a serum creatinine 0.90, normal ALT/AST.  Hemoglobin A1c done in 2023 was 6.3%. ECG done today showed sinus rhythm with a heart rate of 86 bpm.  The patient reports that he has been feeling generally well from the cardiac standpoint. He denies any new chest pain, shortness of breath, palpitations and lightheadedness. He states that he had not been taking carvedilol for at least a month as it was not covered by his insurance. He states that he takes  his other medications as prescribed. During my exam, he was resting comfortably on the exam table.             Assessment/Plan:   1. History of cardiomyopathy   The patient has a history of cardiomyopathy with ejection fraction david of 30% now improved to 55%.  Echocardiogram done on 10/31/23 showed low normal left ventricular systolic function with a 55% estimated ejection fraction.  He does not appear significantly volume overloaded on exam today.  He states that he had not been taking carvedilol for at least a month as it was not covered by his insurance.   I will restart him on carvedilol 6.25 mg twice daily.  He should continue his other cardiac medications.  Recent lab works as noted in the HPI.   Lab works as noted below will be done in 6 months prior to his next visit.  I discussed with him the importance of following a low-sodium heart healthy diet as well as weight loss.   Follow up in 6 months and sooner if necessary.      2. ICD  The patient has an ICD implanted in November 2021.  He should continue to follow with the device clinic.     3. Coronary artery disease  The patient has a history of moderate nonobstructive coronary artery disease as seen on cardiac catheterization done in June 2021.  ECG done today showed sinus rhythm with a heart rate of 86 bpm.    He denies anginal chest discomfort.  Blood pressure appears moderately controlled on exam today.  He states that he had not been taking carvedilol for at least a month as it was not covered by his insurance.   I will restart him on carvedilol 6.25 mg twice daily.  He should continue his other antihypertensive medications and antiplatelet therapy.  Echocardiogram done on 10/31/23 showed low normal left ventricular systolic function with a 55% estimated ejection fraction.  Outside lipid panel done in December 2023 showed LDL cholesterol 42 and triglycerides of 239 on atorvastatin 80 mg daily.   Recent lab works as noted in the HPI.   Lab works as  noted below will be done in 6 months prior to his next visit.  Please see lifestyle recommendations below.  Follow up in 6 months and sooner if necessary.      4. Hypertension  The patient has a history of hypertension which appears moderately controlled on exam today.  He states that he had not been taking carvedilol for at least a month as it was not covered by his insurance.   I will restart him on carvedilol 6.25 mg twice daily.  He should continue his other antihypertensive medications and monitor his blood pressure at home.      5. Dyslipidemia  Outside lipid panel done in December 2023 showed LDL cholesterol 42 and triglycerides of 239 on atorvastatin 80 mg daily.   Lipid panel will be updated in 6 months.   Please see lifestyle recommendations below.     6. Diabetes mellitus  Hemoglobin A1c done in November 2023 was 6.3%.   Medication management per primary care provider.       Orders:   Restart carvedilol 6.25 mg daily.  BMP/magnesium  CMP/lipid/magnesium/CBC in 6 months,   Follow-up in 6 months.    Lifestyle Recommendations  I recommend a whole-food plant-based diet, an eating pattern that encourages the consumption of unrefined plant foods (such as fruits, vegetables, tubers, whole grains, legumes, nuts and seeds) and discourages meats, dairy products, eggs and processed foods.     The AHA/ACC recommends that the patient consume a dietary pattern that emphasizes intake of vegetables, fruits, and whole grains; includes low-fat dairy products, poultry, fish, legumes, non-tropical vegetable oils, and nuts; and limits intake of sodium, sweets, sugar-sweetened beverages, and red meats.  Adapt this dietary pattern to appropriate calorie requirements (a 500-750 kcal/day deficit to loose weight), personal and cultural food preferences, and nutrition therapy for other medical conditions (including diabetes).  Achieve this pattern by following plans such as the [place] Mediterranean, DASH dietary pattern, or AHA  diet.     Engage in 2 hours and 30 minutes per week of moderate-intensity physical activity, or 1 hour and 15 minutes (75 minutes) per week of vigorous-intensity aerobic physical activity, or an equivalent combination of moderate and vigorous-intensity aerobic physical activity. Aerobic activity should be performed in episodes of at least 10 minutes preferably spread throughout the week.     Adhering to a heart healthy diet, regular exercise habits, avoidance of tobacco products, and maintenance of a healthy weight are crucial components of their heart disease risk reduction.     Any positive review of systems not specifically addressed in the office visit today should be evaluated and treated by the patients primary care physician or in an emergency department if necessary     Patient was notified that results from ordered tests will be called to the patient if it changes current management; it will otherwise be discussed at a future appointment and available on  [facility].     Thank you for allowing me to participate in the care of this patient.        This document was generated using the assistance of voice recognition software. If there are any errors of spelling, grammar, syntax, or meaning; please feel free to contact me directly for clarification.    Past Medical History:  He has no past medical history on file.    Past Surgical History:  He has a past surgical history that includes Other surgical history (06/11/2021).      Social History:  He reports that he has never smoked. He has never used smokeless tobacco. No history on file for alcohol use and drug use.    Family History:  Family History   Problem Relation Name Age of Onset    No Known Problems Mother      No Known Problems Father          Allergies:  Patient has no known allergies.    Outpatient Medications:  Current Outpatient Medications   Medication Instructions    aspirin 81 mg EC tablet 1 tablet, oral, Daily    atorvastatin (Lipitor) 80 mg  "tablet 1 tablet, oral, Daily    carvedilol (Coreg) 12.5 mg tablet take 1 & 1/2 tablet by mouth twice daily    cetirizine (ZYRTEC) 10 mg, oral, Daily RT    ciclopirox (Penlac) 8 % solution 1 Application, Topical, Daily, nails    empagliflozin (Jardiance) 10 mg 1 tablet, oral, Daily    Entresto 49-51 mg tablet 1 tablet, oral, 2 times daily    furosemide (LASIX) 20 mg, oral, Daily    glimepiride (Amaryl) 4 mg tablet 1 tablet, oral, Daily    HYDROcodone-acetaminophen (Vicodin) 5-300 mg tablet 1-2 tablets, oral, 4 TO 6 HOURS AS NEEDED FOR PAIN.    HYDROcodone-acetaminophen (Vicodin) 5-300 mg tablet 1 tablet, oral, Every 8 hours    Jardiance 25 mg, oral, Daily with breakfast    ketoconazole (NIZOral) 2 % cream 1 Application, Topical, 2 times daily    metFORMIN (Glucophage) 500 mg tablet 1 tablet, oral, 2 times daily    omeprazole (PriLOSEC) 40 mg DR capsule 1 capsule, oral, Daily    OneTouch Ultra Test strip use to test blood sugar twice a day as instructed    sildenafil (Viagra) 100 mg tablet 1 tablet, oral, As needed, 30 TO 60 MINUTES BEFORE SEXUAL INTERCOURSE    terbinafine (LamISIL) 250 mg tablet 1 tablet, oral, Daily    traZODone (Desyrel) 50 mg tablet 1 tablet, oral, Nightly        ROS:  A 14 point review of systems was done and is negative other than as stated in HPI    Vitals:      9/17/2021    10:56 AM 10/25/2021     8:30 AM 1/10/2022     9:21 AM 3/18/2022    11:24 AM 11/4/2022     2:23 PM 5/31/2023     1:57 PM 11/3/2023    10:51 AM   Vitals   Systolic 108 132 120 124 111 132 88   Diastolic 62 76 60 64 71 78 62   Heart Rate 78 78 84 68 74 57 67   Resp 18 16 16 16 16     Height (in) 1.803 m (5' 11\") 1.803 m (5' 11\") 1.803 m (5' 11\") 1.803 m (5' 11\") 1.803 m (5' 11\") 1.803 m (5' 11\") 1.803 m (5' 11\")   Weight (lb) 211.38 207 216.56 212 214 214 196   BMI 29.48 kg/m2 28.87 kg/m2 30.2 kg/m2 29.57 kg/m2 29.85 kg/m2 29.85 kg/m2 27.34 kg/m2   BSA (m2) 2.19 m2 2.17 m2 2.22 m2 2.2 m2 2.21 m2 2.21 m2 2.11 m2        Physical " Exam:   Constitutional: Cooperative, in no acute distress, alert, appears stated age.  Skin: Skin color, texture, turgor normal. No rashes or lesions.  Head: Normocephalic. No masses, lesions, tenderness or abnormalities  Eyes: Extraocular movements are grossly intact.  Mouth and throat: Mucous membranes moist  Neck: Neck supple, no carotid bruits, no JVD  Respiratory: Lungs clear to auscultation, no wheezing or rhonchi, no use of accessory muscles  Chest wall: ICD, normal excursion with respiration  Cardiovascular: Regular rhythm without murmur, gallop, or rubs  Gastrointestinal: Abdomen soft, nontender. Bowel sounds normal.  Musculoskeletal: Strength equal in upper extremities  Extremities: No pitting edema  Neurologic: Sensation grossly intact, alert and oriented x3    Intake/Output:   No intake/output data recorded.    Outpatient Medications  Current Outpatient Medications on File Prior to Visit   Medication Sig Dispense Refill    aspirin 81 mg EC tablet Take 1 tablet (81 mg) by mouth once daily.      atorvastatin (Lipitor) 80 mg tablet Take 1 tablet (80 mg) by mouth once daily.      carvedilol (Coreg) 12.5 mg tablet take 1 & 1/2 tablet by mouth twice daily 270 tablet 3    cetirizine (ZyrTEC) 10 mg tablet Take 1 tablet (10 mg) by mouth once daily.      ciclopirox (Penlac) 8 % solution Apply 1 Application topically once daily. nails      empagliflozin (Jardiance) 10 mg Take 1 tablet (10 mg) by mouth once daily.      Entresto 49-51 mg tablet TAKE ONE TABLET BY MOUTH TWO TIMES A  tablet 1    furosemide (Lasix) 40 mg tablet Take 0.5 tablets (20 mg) by mouth once daily. 90 tablet 1    glimepiride (Amaryl) 4 mg tablet Take 1 tablet (4 mg) by mouth once daily.      HYDROcodone-acetaminophen (Vicodin) 5-300 mg tablet Take 1-2 tablets by mouth. 4 TO 6 HOURS AS NEEDED FOR PAIN.      HYDROcodone-acetaminophen (Vicodin) 5-300 mg tablet Take 1 tablet by mouth every 8 hours.      Jardiance 25 mg Take 1 tablet (25 mg)  by mouth once daily with a meal.      ketoconazole (NIZOral) 2 % cream Apply 1 Application topically 2 times a day.      metFORMIN (Glucophage) 500 mg tablet Take 1 tablet (500 mg) by mouth 2 times a day.      omeprazole (PriLOSEC) 40 mg DR capsule Take 1 capsule (40 mg) by mouth once daily.      OneTouch Ultra Test strip use to test blood sugar twice a day as instructed      sildenafil (Viagra) 100 mg tablet Take 1 tablet (100 mg) by mouth if needed. 30 TO 60 MINUTES BEFORE SEXUAL INTERCOURSE      terbinafine (LamISIL) 250 mg tablet Take 1 tablet (250 mg) by mouth once daily.      traZODone (Desyrel) 50 mg tablet Take 1 tablet (50 mg) by mouth once daily at bedtime.       No current facility-administered medications on file prior to visit.       Labs: (past 26 weeks)  Recent Results (from the past 4368 hour(s))   Transthoracic Echo (TTE) Complete    Collection Time: 10/31/23  9:51 AM   Result Value Ref Range    LV A4C EF 61.6    POCT GLYCOSYLATED HEMOGLOBIN (HGB A1C)    Collection Time: 11/22/23  1:10 PM   Result Value Ref Range    POCT Hemoglobin A1C 6.3 4.2 - 5.6 %       ECG  Encounter Date: 11/03/23   ECG 12 Lead    Narrative    Sinus rhythm, heart rate 67 bpm.       Echocardiogram  No results found for this or any previous visit from the past 1095 days.      CV Studies:  EKG:  Encounter Date: 11/03/23   ECG 12 Lead    Narrative    Sinus rhythm, heart rate 67 bpm.     Echocardiogram:   Echocardiogram     [place] [address]  Phone [phone] Fax [phone]    TRANSTHORACIC ECHOCARDIOGRAM REPORT      Patient Name:     EMMETT Fenton Physician:   [id number] Johnson Oh MD  Study Date:       9/3/2021        Referring Physician: [id number] WADE  MRN/PID:          [id number]        PCP:  Accession/Order#: [id number]    Department Location: [facility] Echo Lab  YOB: 1960       Fellow:  Gender:           M                Nurse:  Admit Date:                       Sonographer:         Lucero Guillory [facility]  Admission Status: Outpatient      Additional Staff:  Height:           180.34 cm       CC Report to:  Weight:           95.71 kg        Study Type:          Echocardiogram  BSA:              2.16 m2  Blood Pressure: 112 /64 mmHg    Diagnosis/ICD: I25.10-Atherosclerotic heart disease of native coronary artery  without angina pectoris; I50.42-Chronic combined systolic  (congestive) and diastolic (congestive) heart failure (CHF);  I10-Essential (primary) hypertension  Indication:    Limited echo for LVF  Procedure/CPT: Echo Limited-17595; Doppler Limited-12907  Study Detail: The following Echo studies were performed: 2D and Doppler.      PHYSICIAN INTERPRETATION:  Left Ventricle: The left ventricular systolic function is moderately decreased, with an estimated ejection fraction of 30-35%. There is global hypokinesis of the left ventricle with minor regional variations. The left ventricular cavity size is not assessed. Left ventricular diastolic filling was not assessed.  Left Atrium: The left atrium was not assessed.  Right Ventricle: The right ventricle was not assessed. Right ventricular systolic function not assessed.  Right Atrium: The right atrium was not assessed.  Aortic Valve: The aortic valve was not assessed. Aortic valve regurgitation was not assessed.  Mitral Valve: The mitral valve was not assessed. Mitral valve regurgitation was not assessed.  Tricuspid Valve: The tricuspid valve was not assessed. Tricuspid regurgitation was not assessed.  Pulmonic Valve: The pulmonic valve was not assessed. The pulmonic valve regurgitation was not assessed.  Pericardium: Pericardial effusion was not assessed.  Aorta: The aortic root was not assessed.      CONCLUSIONS:  1. The left ventricular systolic function is moderately decreased with a 30-35% estimated ejection fraction.  2. There is global hypokinesis of the left ventricle with  minor regional variations.    QUANTITATIVE DATA SUMMARY:  2D MEASUREMENTS:  Normal Ranges:  IVSd:          1.20 cm    (0.6-1.1cm)  LVPWd:         1.20 cm    (0.6-1.1cm)  LVIDd:         6.00 cm    (3.9-5.9cm)  LVIDs:         5.20 cm  LV Mass Index: 145.6 g/m2  LV % FS        13.3 %    LA VOLUME:  Normal Ranges:  LA Vol A4C:        67.5 ml   (22+/-6mL/m2)  LA Vol Index A4C:  31.3ml/m2  LA Area A4C:       20.9 cm2  LA Major Axis A4C: 5.5 cm  LA Vol A4C:        68.0 ml    RA VOLUME BY A/L METHOD:  Normal Ranges:  RA Area A4C: 13.4 cm2    LV SYSTOLIC FUNCTION BY 2D PLANIMETRY (MOD):  Normal Ranges:  EF-A4C View: 35.5 % (>55%)  EF-A2C View: 36.4 %  EF-Biplane:  36.1 %    LV DIASTOLIC FUNCTION:  Normal Ranges:  MV Peak E:        0.51 m/s    (0.7-1.2 m/s)  MV Peak A:        0.90 m/s    (0.42-0.7 m/s)  E/A Ratio:        0.57        (1.0-2.2)  MV e'             0.06 m/s    (>8.0)  MV lateral e'     0.07 m/s  MV medial e'      0.05 m/s  MV A Dur:         124.00 msec  E/e' Ratio:       8.55        (<8.0)  LV IVRT:          106 msec    (<100ms)  PulmV A Revs Dur: 132.00 msec    MITRAL VALVE:  Normal Ranges:  MV DT: 203 msec (150-240msec)    AORTIC VALVE:  Normal Ranges:  LVOT Max Jorge:  0.71 m/s (<1.1m/s)  LVOT VTI:      16.10 cm  LVOT Diameter: 2.20 cm  (1.8-2.4cm)    RIGHT VENTRICLE:  RV 1 3.2 cm  RV 2 1.7 cm  RV 3 9.0 cm    TRICUSPID VALVE/RVSP:  Normal Ranges:  IVC Diam: 1.48 cm    Pulmonary Veins:  PulmV A Revs Dur: 132.00 msec      [id number] Johnson Oh MD  Electronically signed on 9/4/2021 at 8:58:48 AM         Final     Stress Testing IMESULT([id number]):   NM CARDIAC STRESS REST (MYOCARDIAL PERFUSION MIBI) 05/31/2021    Narrative  MRN: [id number]  Patient Name: EMMETT ALTAMIRANO    STUDY:  CARDIAC STRESS/REST INJECTION; PART 2 STRESS OR REST (NO CHARGE);  CARDIAC STRESS/REST (MYOCARDIAL PERFUSION/MIBI);  5/31/2021 11:36 am    INDICATION:  elevated troponin/SOB.    COMPARISON:  None.    ACCESSION NUMBER(S):  [id number];  [id number]; [id number]    ORDERING CLINICIAN:  DOUG WADE    TECHNIQUE:  DIVISION OF NUCLEAR MEDICINE  PHARMACOLOGIC STRESS MYOCARDIAL PERFUSION SCAN, ONE DAY PROTOCOL    The patient received an intravenous dose of  11.8 mCi of Tc-99m  tetrofosmin and resting emission tomographic (SPECT) images of the  myocardium were acquired. The patient then received an intravenous  infusion of 0.4mg regadenoson (Lexiscan) followed by an additional  dose of  33.4 mCi of Tc-99m tetrofosmin. Stress phase SPECT images of  the myocardium were then acquired. These included ECG-gated images to  assess and quantify ventricular function.    FINDINGS:  There is a small fixed defect in the anteroseptal and apical  segments. No evidence of ischemia.    Calculated ejection fraction of 27% with global hypokinesis and  akinesis in the distal anterior, apical, septal, and inferior walls.    Impression  1. There is a small fixed defect in the anteroseptal and apical  segments. There is a low probability of ischemia  2. Calculated ejection fraction 27% with global hypokinesis and  akinesis in the distal anterior, apical, septal, and inferior walls.    Cardiac Catheterization:   Adult Cath     [facility], Cath Lab  [address] [place] [address]  Phone [phone] Fax [phone]    Cardiovascular Catheterization Report    Patient Name:     EMMETT ALTAMIRANO Performing Physician: [id number] Gallego MD  Study Date:       6/1/2021        Verifying Physician:  Klever Gallego MD  MRN/PID:          [id number]        Cardiologist:  Accession/Order#: [id number]       Referring Physician:  [id number] DOUG WADE  YOB: 1960       Referring Physician:  Gender:           M               Referring Physician:      Study:            Left and Right Heart Catheterization  Additional Study: IVUS - Intravascular Ultrasound  Additional Study: FFR - Fractional Flow Reserve      Indications:  EMMETT ALTAMIRANO is a 61 year  old male who presents with hypertension, dyslipidemia and diabetes. Cardiomyopathy, with a chest pain assessment of atypical angina. Study performed as an urgent cath procedure. Heart failure.    Medical History:  Stress test performed: Yes. Stress test type: Stress Nuclear. Stress result: Negative. CTA performed: No. [place]at[place] accessed: No. LVEF Assessed: Yes. LVEF = 25%.    Procedure Description:  After infiltration with 2% Lidocaine, the right radial artery was cannulated with a modified Seldinger technique. Subsequently a 6 Ivorian sheath was placed retrograde in the right radial artery. Selective coronary catheterization was performed using a 6 Fr catheter(s) exchanged over a guide wire to cannulate the coronary arteries. A 6 tip catheter was used for left coronary injections. A 6 tip catheter was used for right coronary injections.  Additional catheter(s) used to visualize the coronary arteries were: Marisa. Multiple injections of contrast were made into the left and right coronary arteries with angiograms recorded in multiple projections. Cardiac output was calculated via the Mi method. After completion of the procedure, the arterial sheath was pulled and a TR Band Radial Compression Device was utilized to obtain patent hemostasis.    Coronary Angiography:  The coronary circulation is right dominant.    Coronary Angiography Comments:  Given the evidence of borderline significant ostial LCx lesion, I proceeded with IVUS to evaluate for lesion significant before dedication to LM stenting. IVUS came back showing no evidence of significant ostial disease with a diameter of 3.0mm at the ostium and area of 11mm2. Thus, iFR was used for confirmation that showed evidence of negative ischemia with values of 0.93 repeated 3 times for confirmation, thus no PCI was performed.      Left Main Coronary Artery:  The left main coronary artery is a normal caliber vessel. The left main arises normally from the left coronary  sinus of Valsalva and bifurcates into the LAD and circumflex coronary arteries. The left main coronary artery showed no significant disease or stenosis greater than 30%.    Left Anterior Descending Coronary Artery Distribution:  The left anterior descending coronary artery is a normal caliber vessel. The LAD arises normally from the left main coronary artery. The LAD demonstrated no significant disease or stenosis greater than 30%.    Circumflex Coronary Artery Distribution:  The circumflex coronary artery is a normal caliber vessel. The circumflex arises normally from the left main coronary artery and terminates in the AV groove. The circumflex revealed moderate atherosclerotic disease. The ostial circumflex coronary artery showed 50% stenosis. This lesion was eccentric. Negative iFR of ostial LCx (repeated 3 times) at 0.93 and IVUS showed area of 11mm2 with a max diameter of 3.0mm at the ostium.    Right Heart Catheterization:  Cardiac output was calculated via the Mi method. Normal filling pressures. Normal ventricular filling pressure. Cardiac output is normal. Preserved cardiac output at rest. No pulmonary vascular resistance. Normal cardiac output without evidence of volume overload and normal right sided filling pressures, mildly elevated left sided filling pressures.    Right Coronary Artery Distribution:    The right coronary artery is a normal caliber vessel. The RCA arises normally from the right sinus of Valsalva. The RCA showed no significant disease or stenosis greater than 30%. The mid right coronary artery showed 40% stenosis.    Coronary Lesion Summary:  Vessel       Stenosis   Vessel Segment  Circumflex 50% stenosis     ostial  RCA        40% stenosis      mid        Complications:  No in-lab complications observed.    Cardiac Cath Transition of Care Summary:  Post Procedure Diagnosis: Mild CAD.  Findings:                 Non obstructive CAD with negative IVUS and RFR for  ischemia.  Blood Loss:                Estimated blood loss during the procedure was 0 mls.  Specimens Removed:        Number of specimen(s) removed: none.      Recommendations:  Management of HF per primary team.    ____________________________________________________________________________________  CONCLUSIONS:  1. Negative iFR of ostial LCx (repeated 3 times) at 0.93 and IVUS showed area of 11mm2 with a max diameter of 3.0mm at the ostium.  2. Evidence of normal CI/CO and normal right sided filling pressures with mildly elevated left sided filling pressures.  3. Continue optimal therapy for NICM including adequate blood pressure control.  4. No indication for revascularization at the current time.    ____________________________________________________________________________________  CPT Codes:  Right & Left Heart Cath w/ventriculography/Coronary angio (RHC)([facility])-74531; Moderate Sedation Services initial 15 minutes patient >5 years-29998; Moderate Sedation Services 1st additional 15 minutes patient >5 years-29638; IVUS, Left Circumflex initial Vessel (PCI)-17918.LC; FFR, initial Vessel (PCI)-22355    ICD 10 Codes:  I42.0-Dilated cardiomyopathy    [id number] Walt Gallego MD  Performing Physician  Electronically signed by [id number] Walt Gallego MD on 6/1/2021 at 12:20:39 PM      cc Report to: [id number] DOUG WADE           Final   No results found for this or any previous visit from the past 3650 days.     Cardiac Scoring: No results found for this or any previous visit from the past 1825 days.    AAA : No results found for this or any previous visit from the past 1825 days.    OTHER: No results found for this or any previous visit from the past 1825 days.    LAST IMAGING RESULTS  ECG 12 Lead  Sinus rhythm, heart rate 67 bpm.      Problem List Items Addressed This Visit       CAD in native artery    Diabetes mellitus (Multi)    Dyslipidemia    HTN (hypertension), benign    ICD (implantable cardioverter-defibrillator) in place    History of  cardiomyopathy - Primary         Christian Montano DO, FACC, FACOI

## 2024-05-03 ENCOUNTER — OFFICE VISIT (OUTPATIENT)
Dept: CARDIOLOGY | Facility: CLINIC | Age: 64
End: 2024-05-03
Payer: COMMERCIAL

## 2024-05-03 VITALS
HEART RATE: 86 BPM | BODY MASS INDEX: 27.58 KG/M2 | SYSTOLIC BLOOD PRESSURE: 138 MMHG | HEIGHT: 71 IN | DIASTOLIC BLOOD PRESSURE: 72 MMHG | WEIGHT: 197 LBS

## 2024-05-03 DIAGNOSIS — E78.5 DYSLIPIDEMIA: ICD-10-CM

## 2024-05-03 DIAGNOSIS — I25.10 CAD IN NATIVE ARTERY: ICD-10-CM

## 2024-05-03 DIAGNOSIS — E08.00 DIABETES MELLITUS DUE TO UNDERLYING CONDITION WITH HYPEROSMOLARITY WITHOUT COMA, WITHOUT LONG-TERM CURRENT USE OF INSULIN (MULTI): ICD-10-CM

## 2024-05-03 DIAGNOSIS — Z95.810 ICD (IMPLANTABLE CARDIOVERTER-DEFIBRILLATOR) IN PLACE: ICD-10-CM

## 2024-05-03 DIAGNOSIS — I10 HTN (HYPERTENSION), BENIGN: ICD-10-CM

## 2024-05-03 DIAGNOSIS — Z86.79 HISTORY OF CARDIOMYOPATHY: Primary | ICD-10-CM

## 2024-05-03 DIAGNOSIS — I50.42 CHRONIC COMBINED SYSTOLIC AND DIASTOLIC HEART FAILURE, NYHA CLASS 3 (MULTI): ICD-10-CM

## 2024-05-03 DIAGNOSIS — R53.83 OTHER FATIGUE: ICD-10-CM

## 2024-05-03 PROCEDURE — 1036F TOBACCO NON-USER: CPT | Performed by: INTERNAL MEDICINE

## 2024-05-03 PROCEDURE — 3075F SYST BP GE 130 - 139MM HG: CPT | Performed by: INTERNAL MEDICINE

## 2024-05-03 PROCEDURE — 3078F DIAST BP <80 MM HG: CPT | Performed by: INTERNAL MEDICINE

## 2024-05-03 PROCEDURE — 99214 OFFICE O/P EST MOD 30 MIN: CPT | Performed by: INTERNAL MEDICINE

## 2024-05-03 PROCEDURE — 1159F MED LIST DOCD IN RCRD: CPT | Performed by: INTERNAL MEDICINE

## 2024-05-03 PROCEDURE — 93000 ELECTROCARDIOGRAM COMPLETE: CPT | Performed by: INTERNAL MEDICINE

## 2024-05-03 RX ORDER — CARVEDILOL 6.25 MG/1
6.25 TABLET ORAL
Qty: 180 TABLET | Refills: 3 | Status: SHIPPED | OUTPATIENT
Start: 2024-05-03 | End: 2025-05-03

## 2024-05-03 RX ORDER — METFORMIN HYDROCHLORIDE 500 MG/1
500 TABLET ORAL
COMMUNITY

## 2024-05-03 RX ORDER — GLIMEPIRIDE 2 MG/1
2 TABLET ORAL
COMMUNITY
Start: 2024-02-20

## 2024-05-10 ENCOUNTER — LAB (OUTPATIENT)
Dept: LAB | Facility: LAB | Age: 64
End: 2024-05-10
Payer: COMMERCIAL

## 2024-05-10 DIAGNOSIS — Z95.810 ICD (IMPLANTABLE CARDIOVERTER-DEFIBRILLATOR) IN PLACE: ICD-10-CM

## 2024-05-10 DIAGNOSIS — E78.5 DYSLIPIDEMIA: ICD-10-CM

## 2024-05-10 DIAGNOSIS — I10 HTN (HYPERTENSION), BENIGN: ICD-10-CM

## 2024-05-10 DIAGNOSIS — R53.83 OTHER FATIGUE: ICD-10-CM

## 2024-05-10 DIAGNOSIS — I25.10 CAD IN NATIVE ARTERY: ICD-10-CM

## 2024-05-10 DIAGNOSIS — Z86.79 HISTORY OF CARDIOMYOPATHY: ICD-10-CM

## 2024-05-10 DIAGNOSIS — I50.42 CHRONIC COMBINED SYSTOLIC AND DIASTOLIC HEART FAILURE, NYHA CLASS 3 (MULTI): ICD-10-CM

## 2024-05-10 DIAGNOSIS — E08.00 DIABETES MELLITUS DUE TO UNDERLYING CONDITION WITH HYPEROSMOLARITY WITHOUT COMA, WITHOUT LONG-TERM CURRENT USE OF INSULIN (MULTI): ICD-10-CM

## 2024-05-10 LAB
ANION GAP SERPL CALC-SCNC: 13 MMOL/L (ref 10–20)
BUN SERPL-MCNC: 23 MG/DL (ref 6–23)
CALCIUM SERPL-MCNC: 9.5 MG/DL (ref 8.6–10.3)
CHLORIDE SERPL-SCNC: 105 MMOL/L (ref 98–107)
CO2 SERPL-SCNC: 28 MMOL/L (ref 21–32)
CREAT SERPL-MCNC: 0.73 MG/DL (ref 0.5–1.3)
EGFRCR SERPLBLD CKD-EPI 2021: >90 ML/MIN/1.73M*2
GLUCOSE SERPL-MCNC: 115 MG/DL (ref 74–99)
MAGNESIUM SERPL-MCNC: 2.02 MG/DL (ref 1.6–2.4)
POTASSIUM SERPL-SCNC: 4.6 MMOL/L (ref 3.5–5.3)
SODIUM SERPL-SCNC: 141 MMOL/L (ref 136–145)

## 2024-05-10 PROCEDURE — 36415 COLL VENOUS BLD VENIPUNCTURE: CPT

## 2024-05-10 PROCEDURE — 80048 BASIC METABOLIC PNL TOTAL CA: CPT

## 2024-05-10 PROCEDURE — 83735 ASSAY OF MAGNESIUM: CPT

## 2024-05-16 ENCOUNTER — APPOINTMENT (OUTPATIENT)
Dept: CARDIOLOGY | Facility: HOSPITAL | Age: 64
End: 2024-05-16
Payer: COMMERCIAL

## 2024-05-22 ENCOUNTER — APPOINTMENT (OUTPATIENT)
Dept: CARDIOLOGY | Facility: HOSPITAL | Age: 64
End: 2024-05-22
Payer: COMMERCIAL

## 2024-07-01 ENCOUNTER — HOSPITAL ENCOUNTER (OUTPATIENT)
Dept: CARDIOLOGY | Facility: HOSPITAL | Age: 64
Discharge: HOME | End: 2024-07-01
Payer: COMMERCIAL

## 2024-07-01 DIAGNOSIS — Z95.810 PRESENCE OF AUTOMATIC (IMPLANTABLE) CARDIAC DEFIBRILLATOR: ICD-10-CM

## 2024-07-01 DIAGNOSIS — I50.42 CHRONIC COMBINED SYSTOLIC (CONGESTIVE) AND DIASTOLIC (CONGESTIVE) HEART FAILURE (MULTI): ICD-10-CM

## 2024-07-01 PROCEDURE — 93282 PRGRMG EVAL IMPLANTABLE DFB: CPT

## 2024-07-16 DIAGNOSIS — I50.42 CHRONIC COMBINED SYSTOLIC AND DIASTOLIC HEART FAILURE, NYHA CLASS 3 (MULTI): Primary | ICD-10-CM

## 2024-07-19 RX ORDER — SPIRONOLACTONE 25 MG/1
25 TABLET ORAL DAILY
Qty: 90 TABLET | Refills: 1 | Status: SHIPPED | OUTPATIENT
Start: 2024-07-19

## 2024-07-28 DIAGNOSIS — I50.42 CHRONIC COMBINED SYSTOLIC AND DIASTOLIC HEART FAILURE, NYHA CLASS 3 (MULTI): ICD-10-CM

## 2024-08-05 DIAGNOSIS — I50.42 CHRONIC COMBINED SYSTOLIC AND DIASTOLIC HEART FAILURE, NYHA CLASS 3 (MULTI): ICD-10-CM

## 2024-08-05 RX ORDER — SACUBITRIL AND VALSARTAN 49; 51 MG/1; MG/1
1 TABLET, FILM COATED ORAL 2 TIMES DAILY
Qty: 180 TABLET | Refills: 1 | Status: SHIPPED | OUTPATIENT
Start: 2024-08-05

## 2024-08-09 RX ORDER — SACUBITRIL AND VALSARTAN 49; 51 MG/1; MG/1
1 TABLET, FILM COATED ORAL 2 TIMES DAILY
Qty: 180 TABLET | Refills: 0 | OUTPATIENT
Start: 2024-08-09

## 2024-08-20 ENCOUNTER — HOSPITAL ENCOUNTER (OUTPATIENT)
Dept: CARDIOLOGY | Facility: HOSPITAL | Age: 64
Discharge: HOME | End: 2024-08-20
Payer: COMMERCIAL

## 2024-08-20 DIAGNOSIS — Z95.810 PRESENCE OF AUTOMATIC (IMPLANTABLE) CARDIAC DEFIBRILLATOR: ICD-10-CM

## 2024-10-08 ENCOUNTER — HOSPITAL ENCOUNTER (OUTPATIENT)
Dept: CARDIOLOGY | Facility: HOSPITAL | Age: 64
Discharge: HOME | End: 2024-10-08
Payer: COMMERCIAL

## 2024-10-08 DIAGNOSIS — I50.1 LEFT VENTRICULAR FAILURE, UNSPECIFIED (MULTI): Primary | ICD-10-CM

## 2024-10-08 DIAGNOSIS — Z95.810 PRESENCE OF AUTOMATIC (IMPLANTABLE) CARDIAC DEFIBRILLATOR: ICD-10-CM

## 2024-10-08 PROCEDURE — 93296 REM INTERROG EVL PM/IDS: CPT

## 2024-11-03 NOTE — PROGRESS NOTES
[facility] Heart and Vascular Cardiology    Patient Name: Prosper Villafuerte  Patient : 1960      Scribe Attestation  By signing my name below, I, Angelika Hawk   attest that this documentation has been prepared under the direction and in the presence of Christian Montano DO.      Reason for visit:  This is a 64-year-old male here for follow-up regarding his history of cardiomyopathy with ejection fraction david of 30% now improved to 55%, ICD implanted in 2021, moderate nonobstructive coronary artery disease as seen on cardiac catheterization done in 2021, hypertension, dyslipidemia, and diabetes mellitus.     HPI:  This is a 64-year-old male here for follow-up regarding his history of cardiomyopathy with ejection fraction david of 30% now improved to 55%, ICD implanted in 2021, moderate nonobstructive coronary artery disease as seen on cardiac catheterization done in 2021, hypertension, dyslipidemia, and diabetes mellitus.  The patient was last evaluated by me in May 2024.  At that visit I asked that he restart carvedilol 6.25 mg twice daily, ordered blood work including BMP/magnesium, ordered additional blood work including CMP/lipid/magnesium/CBC be drawn in 6 months, and asked the patient to follow-up in 6 months and sooner if necessary.  Patient underwent colonoscopy in 2024.  BMP done in May 2024 showed normal serum sodium and potassium with a serum creatinine of 0.73, serum magnesium was 2.02.  Outside hemoglobin A1c done in 2024 was 6.4%. ECG done today showed sinus rhythm with a heart rate of 67 bpm.  The patient reports that he has been feeling generally well from the cardiac standpoint. He denies any new chest pain, shortness of breath, palpitations and lightheadedness. He states that his blood pressure at home had been well controlled.  He states that he takes all of his medications as prescribed. During my exam, he was resting  comfortably on the exam table.            Assessment/Plan:   1. History of cardiomyopathy   The patient has a history of cardiomyopathy with ejection fraction david of 30% now improved to 55%.  Echocardiogram done on 10/31/23 showed low normal left ventricular systolic function with a 55% estimated ejection fraction.  He does not appear significantly volume overloaded on exam today.  He should continue his current cardiac medications.  Recent lab works as noted in the HPI.   Lab works will be done today and again in 6 months prior to the next visit.   I discussed with him the importance of following a low-sodium heart healthy diet as well as weight loss.   Follow up in 6 months and sooner if necessary.      2. ICD  The patient has an ICD implanted in November 2021.  He should continue to follow with the device clinic.     3. Coronary artery disease  The patient has a history of moderate nonobstructive coronary artery disease as seen on cardiac catheterization done in June 2021.  ECG done today showed sinus rhythm with a heart rate of 67 bpm.     He denies anginal chest discomfort.  Blood pressure appears controlled on exam today.  He should continue his current antihypertensive medications and antiplatelet therapy.  Echocardiogram done on 10/31/23 showed low normal left ventricular systolic function with a 55% estimated ejection fraction.  Outside lipid panel done in December 2023 showed LDL cholesterol 42 and triglycerides of 239 on atorvastatin 80 mg daily.   Recent lab works as noted in the HPI.   Lab works will be done today and again in 6 months prior to the next visit.   Please see lifestyle recommendations below.  Follow up in 6 months and sooner if necessary.      4. Hypertension  The patient has a history of hypertension which appears moderately controlled on exam today.  He should continue his current antihypertensive medications and monitor his blood pressure at home.       5. Dyslipidemia  Outside lipid  panel done in December 2023 showed LDL cholesterol 42 and triglycerides of 239 on atorvastatin 80 mg daily.   Lipid panel was ordered as noted below.  Please see lifestyle recommendations below.     6. Diabetes mellitus  Hemoglobin A1c done in May 2024 was 6.5%.   Medication management per primary care provider.         Orders:   CMP/lipid/magnesium/CBC   BMP/BNP/magnesium in 6 months,   Follow-up in 6 months.    Lifestyle Recommendations  I recommend a whole-food plant-based diet, an eating pattern that encourages the consumption of unrefined plant foods (such as fruits, vegetables, tubers, whole grains, legumes, nuts and seeds) and discourages meats, dairy products, eggs and processed foods.     The AHA/ACC recommends that the patient consume a dietary pattern that emphasizes intake of vegetables, fruits, and whole grains; includes low-fat dairy products, poultry, fish, legumes, non-tropical vegetable oils, and nuts; and limits intake of sodium, sweets, sugar-sweetened beverages, and red meats.  Adapt this dietary pattern to appropriate calorie requirements (a 500-750 kcal/day deficit to loose weight), personal and cultural food preferences, and nutrition therapy for other medical conditions (including diabetes).  Achieve this pattern by following plans such as the [place] Mediterranean, DASH dietary pattern, or AHA diet.     Engage in 2 hours and 30 minutes per week of moderate-intensity physical activity, or 1 hour and 15 minutes (75 minutes) per week of vigorous-intensity aerobic physical activity, or an equivalent combination of moderate and vigorous-intensity aerobic physical activity. Aerobic activity should be performed in episodes of at least 10 minutes preferably spread throughout the week.     Adhering to a heart healthy diet, regular exercise habits, avoidance of tobacco products, and maintenance of a healthy weight are crucial components of their heart disease risk reduction.     Any positive review of  systems not specifically addressed in the office visit today should be evaluated and treated by the patients primary care physician or in an emergency department if necessary     Patient was notified that results from ordered tests will be called to the patient if it changes current management; it will otherwise be discussed at a future appointment and available on [facility].     Thank you for allowing me to participate in the care of this patient.        This document was generated using the assistance of voice recognition software. If there are any errors of spelling, grammar, syntax, or meaning; please feel free to contact me directly for clarification.    Past Medical History:  He has no past medical history on file.    Past Surgical History:  He has a past surgical history that includes Other surgical history (06/11/2021).      Social History:  He reports that he has never smoked. He has never used smokeless tobacco. No history on file for alcohol use and drug use.    Family History:  Family History   Problem Relation Name Age of Onset    No Known Problems Mother      No Known Problems Father          Allergies:  Patient has no known allergies.    Outpatient Medications:  Current Outpatient Medications   Medication Instructions    aspirin 81 mg EC tablet 1 tablet, oral, Daily    atorvastatin (Lipitor) 80 mg tablet 1 tablet, oral, Daily    carvedilol (COREG) 6.25 mg, oral, 2 times daily (morning and late afternoon)    cetirizine (ZYRTEC) 10 mg, oral, Daily RT    ciclopirox (Penlac) 8 % solution 1 Application, Topical, Daily, nails    furosemide (LASIX) 20 mg, oral, Daily    glimepiride (AMARYL) 2 mg, oral, Daily with breakfast    HYDROcodone-acetaminophen (Vicodin) 5-300 mg tablet 1-2 tablets, oral, 4 TO 6 HOURS AS NEEDED FOR PAIN.    HYDROcodone-acetaminophen (Vicodin) 5-300 mg tablet 1 tablet, oral, Every 8 hours    Jardiance 25 mg, oral, Daily with breakfast    ketoconazole (NIZOral) 2 % cream 1 Application,  "Topical, 2 times daily    metFORMIN (GLUCOPHAGE) 500 mg, oral, 2 times daily (morning and late afternoon)    omeprazole (PriLOSEC) 40 mg DR capsule 1 capsule, oral, Daily    OneTouch Ultra Test strip use to test blood sugar twice a day as instructed    sacubitriL-valsartan (Entresto) 49-51 mg tablet 1 tablet, oral, 2 times daily    sildenafil (Viagra) 100 mg tablet 1 tablet, oral, As needed, 30 TO 60 MINUTES BEFORE SEXUAL INTERCOURSE    spironolactone (ALDACTONE) 25 mg, oral, Daily    terbinafine (LamISIL) 250 mg tablet 1 tablet, oral, Daily    traZODone (Desyrel) 50 mg tablet 1 tablet, oral, Nightly        ROS:  A 14 point review of systems was done and is negative other than as stated in HPI    Vitals:      10/25/2021     8:30 AM 1/10/2022     9:21 AM 3/18/2022    11:24 AM 11/4/2022     2:23 PM 5/31/2023     1:57 PM 11/3/2023    10:51 AM 5/3/2024    12:39 PM   Vitals   Systolic 132 120 124 111 132 88 138   Diastolic 76 60 64 71 78 62 72   Heart Rate 78 84 68 74 57 67 86   Resp 16 16 16 16      Height (in) 1.803 m (5' 11\") 1.803 m (5' 11\") 1.803 m (5' 11\") 1.803 m (5' 11\") 1.803 m (5' 11\") 1.803 m (5' 11\") 1.803 m (5' 11\")   Weight (lb) 207 216.56 212 214 214 196 197   BMI 28.87 kg/m2 30.2 kg/m2 29.57 kg/m2 29.85 kg/m2 29.85 kg/m2 27.34 kg/m2 27.48 kg/m2   BSA (m2) 2.17 m2 2.22 m2 2.2 m2 2.21 m2 2.21 m2 2.11 m2 2.12 m2        Physical Exam:   Constitutional: Cooperative, in no acute distress, alert, appears stated age.  Skin: Skin color, texture, turgor normal. No rashes or lesions.  Head: Normocephalic. No masses, lesions, tenderness or abnormalities  Eyes: Extraocular movements are grossly intact.  Mouth and throat: Mucous membranes moist  Neck: Neck supple, no carotid bruits, no JVD  Respiratory: Lungs clear to auscultation, no wheezing or rhonchi, no use of accessory muscles  Chest wall: ICD, normal excursion with respiration  Cardiovascular: Regular rhythm without murmur, gallop, or rubs  Gastrointestinal: " Abdomen soft, nontender. Bowel sounds normal.  Musculoskeletal: Strength equal in upper extremities  Extremities: No pitting edema  Neurologic: Sensation grossly intact, alert and oriented x3    Intake/Output:   No intake/output data recorded.    Outpatient Medications  Current Outpatient Medications on File Prior to Visit   Medication Sig Dispense Refill    aspirin 81 mg EC tablet Take 1 tablet (81 mg) by mouth once daily.      atorvastatin (Lipitor) 80 mg tablet Take 1 tablet (80 mg) by mouth once daily.      carvedilol (Coreg) 6.25 mg tablet Take 1 tablet (6.25 mg) by mouth 2 times a day with meals. 180 tablet 3    cetirizine (ZyrTEC) 10 mg tablet Take 1 tablet (10 mg) by mouth once daily.      ciclopirox (Penlac) 8 % solution Apply 1 Application topically once daily. nails      furosemide (Lasix) 40 mg tablet Take 0.5 tablets (20 mg) by mouth once daily. 90 tablet 1    glimepiride (Amaryl) 2 mg tablet Take 1 tablet (2 mg) by mouth once daily with breakfast.      HYDROcodone-acetaminophen (Vicodin) 5-300 mg tablet Take 1-2 tablets by mouth. 4 TO 6 HOURS AS NEEDED FOR PAIN.      HYDROcodone-acetaminophen (Vicodin) 5-300 mg tablet Take 1 tablet by mouth every 8 hours.      Jardiance 25 mg Take 1 tablet (25 mg) by mouth once daily with breakfast.      ketoconazole (NIZOral) 2 % cream Apply 1 Application topically 2 times a day.      metFORMIN (Glucophage) 500 mg tablet Take 1 tablet (500 mg) by mouth 2 times a day with meals.      omeprazole (PriLOSEC) 40 mg DR capsule Take 1 capsule (40 mg) by mouth once daily.      OneTouch Ultra Test strip use to test blood sugar twice a day as instructed      sacubitriL-valsartan (Entresto) 49-51 mg tablet Take 1 tablet by mouth 2 times a day. 180 tablet 1    sildenafil (Viagra) 100 mg tablet Take 1 tablet (100 mg) by mouth if needed. 30 TO 60 MINUTES BEFORE SEXUAL INTERCOURSE      spironolactone (Aldactone) 25 mg tablet TAKE ONE TABLET BY MOUTH DAILY 90 tablet 1    terbinafine  (LamISIL) 250 mg tablet Take 1 tablet (250 mg) by mouth once daily.      traZODone (Desyrel) 50 mg tablet Take 1 tablet (50 mg) by mouth once daily at bedtime.       No current facility-administered medications on file prior to visit.       Labs: (past 26 weeks)  Recent Results (from the past 26 weeks)   HEMOGLOBIN A1C    Collection Time: 05/09/24  2:40 PM   Result Value Ref Range    Hemoglobin A1C 6.5 (H) 4.3 - 5.6 %    Estimated Average Glucose 140 mg/dL   Basic Metabolic Panel    Collection Time: 05/10/24 11:09 AM   Result Value Ref Range    Glucose 115 (H) 74 - 99 mg/dL    Sodium 141 136 - 145 mmol/L    Potassium 4.6 3.5 - 5.3 mmol/L    Chloride 105 98 - 107 mmol/L    Bicarbonate 28 21 - 32 mmol/L    Anion Gap 13 10 - 20 mmol/L    Urea Nitrogen 23 6 - 23 mg/dL    Creatinine 0.73 0.50 - 1.30 mg/dL    eGFR >90 >60 mL/min/1.73m*2    Calcium 9.5 8.6 - 10.3 mg/dL   Magnesium    Collection Time: 05/10/24 11:09 AM   Result Value Ref Range    Magnesium 2.02 1.60 - 2.40 mg/dL   POCT GLYCOSYLATED HEMOGLOBIN (HGB A1C)    Collection Time: 08/12/24 10:23 AM   Result Value Ref Range    POCT Hemoglobin A1C 6.8 (A) 4.3 - 5.6 %       ECG  Encounter Date: 05/03/24   ECG 12 Lead    Narrative    Sinus rhythm heart rate 86 bpm.       Echocardiogram  No results found for this or any previous visit from the past 1095 days.      CV Studies:  EKG:  Encounter Date: 05/03/24   ECG 12 Lead    Narrative    Sinus rhythm heart rate 86 bpm.     Echocardiogram:   [place] [address]  Phone [phone] Fax [phone]    TRANSTHORACIC ECHOCARDIOGRAM REPORT      Patient Name:     EMMETT Fenton Physician:   [id number] Johnson Oh MD  Study Date:       9/3/2021        Referring Physician: [id number] DOUG WADE  MRN/PID:          [id number]        PCP:  Accession/Order#: [id number]    Department Location: [facility] Echo Lab  YOB: 1960        Fellow:  Gender:           M               Nurse:  Admit Date:                       Sonographer:         Lucero Guillory [facility]  Admission Status: Outpatient      Additional Staff:  Height:           180.34 cm       CC Report to:  Weight:           95.71 kg        Study Type:          Echocardiogram  BSA:              2.16 m2  Blood Pressure: 112 /64 mmHg    Diagnosis/ICD: I25.10-Atherosclerotic heart disease of native coronary artery  without angina pectoris; I50.42-Chronic combined systolic  (congestive) and diastolic (congestive) heart failure (CHF);  I10-Essential (primary) hypertension  Indication:    Limited echo for LVF  Procedure/CPT: Echo Limited-36664; Doppler Limited-02054  Study Detail: The following Echo studies were performed: 2D and Doppler.      PHYSICIAN INTERPRETATION:  Left Ventricle: The left ventricular systolic function is moderately decreased, with an estimated ejection fraction of 30-35%. There is global hypokinesis of the left ventricle with minor regional variations. The left ventricular cavity size is not assessed. Left ventricular diastolic filling was not assessed.  Left Atrium: The left atrium was not assessed.  Right Ventricle: The right ventricle was not assessed. Right ventricular systolic function not assessed.  Right Atrium: The right atrium was not assessed.  Aortic Valve: The aortic valve was not assessed. Aortic valve regurgitation was not assessed.  Mitral Valve: The mitral valve was not assessed. Mitral valve regurgitation was not assessed.  Tricuspid Valve: The tricuspid valve was not assessed. Tricuspid regurgitation was not assessed.  Pulmonic Valve: The pulmonic valve was not assessed. The pulmonic valve regurgitation was not assessed.  Pericardium: Pericardial effusion was not assessed.  Aorta: The aortic root was not assessed.      CONCLUSIONS:  1. The left ventricular systolic function is moderately decreased with a 30-35% estimated ejection fraction.  2. There is global  hypokinesis of the left ventricle with minor regional variations.    QUANTITATIVE DATA SUMMARY:  2D MEASUREMENTS:  Normal Ranges:  IVSd:          1.20 cm    (0.6-1.1cm)  LVPWd:         1.20 cm    (0.6-1.1cm)  LVIDd:         6.00 cm    (3.9-5.9cm)  LVIDs:         5.20 cm  LV Mass Index: 145.6 g/m2  LV % FS        13.3 %    LA VOLUME:  Normal Ranges:  LA Vol A4C:        67.5 ml   (22+/-6mL/m2)  LA Vol Index A4C:  31.3ml/m2  LA Area A4C:       20.9 cm2  LA Major Axis A4C: 5.5 cm  LA Vol A4C:        68.0 ml    RA VOLUME BY A/L METHOD:  Normal Ranges:  RA Area A4C: 13.4 cm2    LV SYSTOLIC FUNCTION BY 2D PLANIMETRY (MOD):  Normal Ranges:  EF-A4C View: 35.5 % (>55%)  EF-A2C View: 36.4 %  EF-Biplane:  36.1 %    LV DIASTOLIC FUNCTION:  Normal Ranges:  MV Peak E:        0.51 m/s    (0.7-1.2 m/s)  MV Peak A:        0.90 m/s    (0.42-0.7 m/s)  E/A Ratio:        0.57        (1.0-2.2)  MV e'             0.06 m/s    (>8.0)  MV lateral e'     0.07 m/s  MV medial e'      0.05 m/s  MV A Dur:         124.00 msec  E/e' Ratio:       8.55        (<8.0)  LV IVRT:          106 msec    (<100ms)  PulmV A Revs Dur: 132.00 msec    MITRAL VALVE:  Normal Ranges:  MV DT: 203 msec (150-240msec)    AORTIC VALVE:  Normal Ranges:  LVOT Max Jorge:  0.71 m/s (<1.1m/s)  LVOT VTI:      16.10 cm  LVOT Diameter: 2.20 cm  (1.8-2.4cm)    RIGHT VENTRICLE:  RV 1 3.2 cm  RV 2 1.7 cm  RV 3 9.0 cm    TRICUSPID VALVE/RVSP:  Normal Ranges:  IVC Diam: 1.48 cm    Pulmonary Veins:  PulmV A Revs Dur: 132.00 msec      [id number] Johnson Oh MD  Electronically signed on 9/4/2021 at 8:58:48 AM         Final     Stress Testing IMGRESULT(SAX6391:1:1825):   NM CARDIAC STRESS REST (MYOCARDIAL PERFUSION MIBI) 05/31/2021    Narrative  MRN: [id number]  Patient Name: EMMETT ALTAMIRANO    STUDY:  CARDIAC STRESS/REST INJECTION; PART 2 STRESS OR REST (NO CHARGE);  CARDIAC STRESS/REST (MYOCARDIAL PERFUSION/MIBI);  5/31/2021 11:36 am    INDICATION:  elevated  troponin/SOB.    COMPARISON:  None.    ACCESSION NUMBER(S):  [id number]; [id number]; [id number]    ORDERING CLINICIAN:  DOUG WADE    TECHNIQUE:  DIVISION OF NUCLEAR MEDICINE  PHARMACOLOGIC STRESS MYOCARDIAL PERFUSION SCAN, ONE DAY PROTOCOL    The patient received an intravenous dose of  11.8 mCi of Tc-99m  tetrofosmin and resting emission tomographic (SPECT) images of the  myocardium were acquired. The patient then received an intravenous  infusion of 0.4mg regadenoson (Lexiscan) followed by an additional  dose of  33.4 mCi of Tc-99m tetrofosmin. Stress phase SPECT images of  the myocardium were then acquired. These included ECG-gated images to  assess and quantify ventricular function.    FINDINGS:  There is a small fixed defect in the anteroseptal and apical  segments. No evidence of ischemia.    Calculated ejection fraction of 27% with global hypokinesis and  akinesis in the distal anterior, apical, septal, and inferior walls.    Impression  1. There is a small fixed defect in the anteroseptal and apical  segments. There is a low probability of ischemia  2. Calculated ejection fraction 27% with global hypokinesis and  akinesis in the distal anterior, apical, septal, and inferior walls.    Cardiac Catheterization:   Adult Cath     [facility], Cath Lab  [address] [place] [address]  Phone [phone] Fax [phone]    Cardiovascular Catheterization Report    Patient Name:     EMMETT ALTAMIRANO Performing Physician: [id number] Gallego MD  Study Date:       6/1/2021        Verifying Physician:  Klever Gallego MD  MRN/PID:          [id number]        Cardiologist:  Accession/Order#: [id number]       Referring Physician:  [id number] DOUG WADE  YOB: 1960       Referring Physician:  Gender:           M               Referring Physician:      Study:            Left and Right Heart Catheterization  Additional Study: IVUS - Intravascular Ultrasound  Additional Study: FFR -  Fractional Flow Reserve      Indications:  EMMETT ALTAMIRANO is a 61 year old male who presents with hypertension, dyslipidemia and diabetes. Cardiomyopathy, with a chest pain assessment of atypical angina. Study performed as an urgent cath procedure. Heart failure.    Medical History:  Stress test performed: Yes. Stress test type: Stress Nuclear. Stress result: Negative. CTA performed: No. [facility] accessed: No. LVEF Assessed: Yes. LVEF = 25%.    Procedure Description:  After infiltration with 2% Lidocaine, the right radial artery was cannulated with a modified Seldinger technique. Subsequently a 6 Albanian sheath was placed retrograde in the right radial artery. Selective coronary catheterization was performed using a 6 Fr catheter(s) exchanged over a guide wire to cannulate the coronary arteries. A 6 tip catheter was used for left coronary injections. A 6 tip catheter was used for right coronary injections.  Additional catheter(s) used to visualize the coronary arteries were: Marisa. Multiple injections of contrast were made into the left and right coronary arteries with angiograms recorded in multiple projections. Cardiac output was calculated via the Mi method. After completion of the procedure, the arterial sheath was pulled and a TR Band Radial Compression Device was utilized to obtain patent hemostasis.    Coronary Angiography:  The coronary circulation is right dominant.    Coronary Angiography Comments:  Given the evidence of borderline significant ostial LCx lesion, I proceeded with IVUS to evaluate for lesion significant before dedication to LM stenting. IVUS came back showing no evidence of significant ostial disease with a diameter of 3.0mm at the ostium and area of 11mm2. Thus, iFR was used for confirmation that showed evidence of negative ischemia with values of 0.93 repeated 3 times for confirmation, thus no PCI was performed.      Left Main Coronary Artery:  The left main coronary artery is a normal  caliber vessel. The left main arises normally from the left coronary sinus of Valsalva and bifurcates into the LAD and circumflex coronary arteries. The left main coronary artery showed no significant disease or stenosis greater than 30%.    Left Anterior Descending Coronary Artery Distribution:  The left anterior descending coronary artery is a normal caliber vessel. The LAD arises normally from the left main coronary artery. The LAD demonstrated no significant disease or stenosis greater than 30%.    Circumflex Coronary Artery Distribution:  The circumflex coronary artery is a normal caliber vessel. The circumflex arises normally from the left main coronary artery and terminates in the AV groove. The circumflex revealed moderate atherosclerotic disease. The ostial circumflex coronary artery showed 50% stenosis. This lesion was eccentric. Negative iFR of ostial LCx (repeated 3 times) at 0.93 and IVUS showed area of 11mm2 with a max diameter of 3.0mm at the ostium.    Right Heart Catheterization:  Cardiac output was calculated via the Mi method. Normal filling pressures. Normal ventricular filling pressure. Cardiac output is normal. Preserved cardiac output at rest. No pulmonary vascular resistance. Normal cardiac output without evidence of volume overload and normal right sided filling pressures, mildly elevated left sided filling pressures.    Right Coronary Artery Distribution:    The right coronary artery is a normal caliber vessel. The RCA arises normally from the right sinus of Valsalva. The RCA showed no significant disease or stenosis greater than 30%. The mid right coronary artery showed 40% stenosis.    Coronary Lesion Summary:  Vessel       Stenosis   Vessel Segment  Circumflex 50% stenosis     ostial  RCA        40% stenosis      mid        Complications:  No in-lab complications observed.    Cardiac Cath Transition of Care Summary:  Post Procedure Diagnosis: Mild CAD.  Findings:                 Non  obstructive CAD with negative IVUS and RFR for  ischemia.  Blood Loss:               Estimated blood loss during the procedure was 0 mls.  Specimens Removed:        Number of specimen(s) removed: none.      Recommendations:  Management of HF per primary team.    ____________________________________________________________________________________  CONCLUSIONS:  1. Negative iFR of ostial LCx (repeated 3 times) at 0.93 and IVUS showed area of 11mm2 with a max diameter of 3.0mm at the ostium.  2. Evidence of normal CI/CO and normal right sided filling pressures with mildly elevated left sided filling pressures.  3. Continue optimal therapy for NICM including adequate blood pressure control.  4. No indication for revascularization at the current time.    ____________________________________________________________________________________  CPT Codes:  Right & Left Heart Cath w/ventriculography/Coronary angio (RHC)([facility])-34382; Moderate Sedation Services initial 15 minutes patient >5 years-63907; Moderate Sedation Services 1st additional 15 minutes patient >5 years-57834; IVUS, Left Circumflex initial Vessel (PCI)-41664.LC; FFR, initial Vessel (PCI)-66976    ICD 10 Codes:  I42.0-Dilated cardiomyopathy    [id number] Walt Gallego MD  Performing Physician  Electronically signed by [id number] Walt Gallego MD on 6/1/2021 at 12:20:39 PM      cc Report to: [id number] DOUG WADE           Final   No results found for this or any previous visit from the past 3650 days.     Cardiac Scoring: No results found for this or any previous visit from the past 1825 days.    AAA : No results found for this or any previous visit from the past 1825 days.    OTHER: No results found for this or any previous visit from the past 1825 days.    LAST IMAGING RESULTS  ECG 12 Lead  Sinus rhythm heart rate 86 bpm.    Problem List Items Addressed This Visit       CAD in native artery    Diabetes mellitus (Multi)    Dyslipidemia    HTN (hypertension), benign    ICD  (implantable cardioverter-defibrillator) in place    History of cardiomyopathy - Primary          Christian Montano DO, FACC, FACOI

## 2024-11-08 ENCOUNTER — APPOINTMENT (OUTPATIENT)
Dept: CARDIOLOGY | Facility: CLINIC | Age: 64
End: 2024-11-08
Payer: COMMERCIAL

## 2024-11-08 ENCOUNTER — LAB (OUTPATIENT)
Dept: LAB | Facility: LAB | Age: 64
End: 2024-11-08
Payer: COMMERCIAL

## 2024-11-08 VITALS
HEIGHT: 71 IN | HEART RATE: 67 BPM | DIASTOLIC BLOOD PRESSURE: 78 MMHG | WEIGHT: 197 LBS | BODY MASS INDEX: 27.58 KG/M2 | SYSTOLIC BLOOD PRESSURE: 142 MMHG

## 2024-11-08 DIAGNOSIS — I50.42 CHRONIC COMBINED SYSTOLIC AND DIASTOLIC HEART FAILURE, NYHA CLASS 3: ICD-10-CM

## 2024-11-08 DIAGNOSIS — I10 HTN (HYPERTENSION), BENIGN: ICD-10-CM

## 2024-11-08 DIAGNOSIS — Z95.810 ICD (IMPLANTABLE CARDIOVERTER-DEFIBRILLATOR) IN PLACE: ICD-10-CM

## 2024-11-08 DIAGNOSIS — R53.83 OTHER FATIGUE: ICD-10-CM

## 2024-11-08 DIAGNOSIS — E78.5 DYSLIPIDEMIA: ICD-10-CM

## 2024-11-08 DIAGNOSIS — Z86.79 HISTORY OF CARDIOMYOPATHY: Primary | ICD-10-CM

## 2024-11-08 DIAGNOSIS — E08.00 DIABETES MELLITUS DUE TO UNDERLYING CONDITION WITH HYPEROSMOLARITY WITHOUT COMA, WITHOUT LONG-TERM CURRENT USE OF INSULIN: ICD-10-CM

## 2024-11-08 DIAGNOSIS — I25.10 CAD IN NATIVE ARTERY: ICD-10-CM

## 2024-11-08 DIAGNOSIS — Z86.79 HISTORY OF CARDIOMYOPATHY: ICD-10-CM

## 2024-11-08 LAB
ALBUMIN SERPL BCP-MCNC: 4.2 G/DL (ref 3.4–5)
ALP SERPL-CCNC: 84 U/L (ref 33–136)
ALT SERPL W P-5'-P-CCNC: 10 U/L (ref 10–52)
ANION GAP SERPL CALC-SCNC: 9 MMOL/L (ref 10–20)
AST SERPL W P-5'-P-CCNC: 18 U/L (ref 9–39)
BILIRUB SERPL-MCNC: 0.6 MG/DL (ref 0–1.2)
BUN SERPL-MCNC: 22 MG/DL (ref 6–23)
CALCIUM SERPL-MCNC: 9 MG/DL (ref 8.6–10.3)
CHLORIDE SERPL-SCNC: 105 MMOL/L (ref 98–107)
CHOLEST SERPL-MCNC: 121 MG/DL (ref 0–199)
CHOLESTEROL/HDL RATIO: 3.3
CO2 SERPL-SCNC: 31 MMOL/L (ref 21–32)
CREAT SERPL-MCNC: 0.79 MG/DL (ref 0.5–1.3)
EGFRCR SERPLBLD CKD-EPI 2021: >90 ML/MIN/1.73M*2
ERYTHROCYTE [DISTWIDTH] IN BLOOD BY AUTOMATED COUNT: 12.9 % (ref 11.5–14.5)
GLUCOSE SERPL-MCNC: 141 MG/DL (ref 74–99)
HCT VFR BLD AUTO: 43.8 % (ref 41–52)
HDLC SERPL-MCNC: 36.8 MG/DL
HGB BLD-MCNC: 14.5 G/DL (ref 13.5–17.5)
LDLC SERPL CALC-MCNC: 65 MG/DL
MAGNESIUM SERPL-MCNC: 1.9 MG/DL (ref 1.6–2.4)
MCH RBC QN AUTO: 30.3 PG (ref 26–34)
MCHC RBC AUTO-ENTMCNC: 33.1 G/DL (ref 32–36)
MCV RBC AUTO: 91 FL (ref 80–100)
NON HDL CHOLESTEROL: 84 MG/DL (ref 0–149)
NRBC BLD-RTO: 0 /100 WBCS (ref 0–0)
PLATELET # BLD AUTO: 333 X10*3/UL (ref 150–450)
POTASSIUM SERPL-SCNC: 4.2 MMOL/L (ref 3.5–5.3)
PROT SERPL-MCNC: 6.3 G/DL (ref 6.4–8.2)
RBC # BLD AUTO: 4.79 X10*6/UL (ref 4.5–5.9)
SODIUM SERPL-SCNC: 141 MMOL/L (ref 136–145)
TRIGL SERPL-MCNC: 94 MG/DL (ref 0–149)
VLDL: 19 MG/DL (ref 0–40)
WBC # BLD AUTO: 10.6 X10*3/UL (ref 4.4–11.3)

## 2024-11-08 PROCEDURE — 83735 ASSAY OF MAGNESIUM: CPT

## 2024-11-08 PROCEDURE — 85027 COMPLETE CBC AUTOMATED: CPT

## 2024-11-08 PROCEDURE — 93005 ELECTROCARDIOGRAM TRACING: CPT | Performed by: INTERNAL MEDICINE

## 2024-11-08 PROCEDURE — 80053 COMPREHEN METABOLIC PANEL: CPT

## 2024-11-08 PROCEDURE — 80061 LIPID PANEL: CPT

## 2024-11-08 PROCEDURE — 99214 OFFICE O/P EST MOD 30 MIN: CPT | Performed by: INTERNAL MEDICINE

## 2024-11-12 ENCOUNTER — HOSPITAL ENCOUNTER (OUTPATIENT)
Dept: CARDIOLOGY | Facility: HOSPITAL | Age: 64
Discharge: HOME | End: 2024-11-12
Payer: COMMERCIAL

## 2024-11-12 DIAGNOSIS — I50.1 LEFT VENTRICULAR FAILURE, UNSPECIFIED (MULTI): ICD-10-CM

## 2025-01-07 ENCOUNTER — APPOINTMENT (OUTPATIENT)
Dept: CARDIOLOGY | Facility: HOSPITAL | Age: 65
End: 2025-01-07
Payer: COMMERCIAL

## 2025-01-26 DIAGNOSIS — I50.42 CHRONIC COMBINED SYSTOLIC AND DIASTOLIC HEART FAILURE, NYHA CLASS 3: ICD-10-CM

## 2025-02-03 RX ORDER — SACUBITRIL AND VALSARTAN 49; 51 MG/1; MG/1
1 TABLET, FILM COATED ORAL 2 TIMES DAILY
Qty: 180 TABLET | Refills: 1 | Status: SHIPPED | OUTPATIENT
Start: 2025-02-03

## 2025-02-03 RX ORDER — FUROSEMIDE 40 MG/1
TABLET ORAL
Qty: 45 TABLET | Refills: 1 | Status: SHIPPED | OUTPATIENT
Start: 2025-02-03

## 2025-02-07 DIAGNOSIS — I50.42 CHRONIC COMBINED SYSTOLIC AND DIASTOLIC HEART FAILURE, NYHA CLASS 3: ICD-10-CM

## 2025-02-11 RX ORDER — FUROSEMIDE 40 MG/1
TABLET ORAL
Qty: 90 TABLET | Refills: 0 | OUTPATIENT
Start: 2025-02-11

## 2025-02-12 ENCOUNTER — HOSPITAL ENCOUNTER (OUTPATIENT)
Dept: CARDIOLOGY | Facility: HOSPITAL | Age: 65
Discharge: HOME | End: 2025-02-12
Payer: COMMERCIAL

## 2025-02-12 DIAGNOSIS — I50.1 LEFT VENTRICULAR FAILURE, UNSPECIFIED (MULTI): ICD-10-CM

## 2025-02-12 PROCEDURE — 93296 REM INTERROG EVL PM/IDS: CPT

## 2025-02-27 ENCOUNTER — TELEPHONE (OUTPATIENT)
Dept: CARDIOLOGY | Facility: HOSPITAL | Age: 65
End: 2025-02-27
Payer: COMMERCIAL

## 2025-02-27 NOTE — TELEPHONE ENCOUNTER
2/27/25  1104  Called patient's pharmacy to inquire if Rx for lasix on 2/3/25 was sent; per My the transmission failed.    New verbal for lasix 40 mg tablet, 0.5 tablet daily given.    Called and informed patient of such with patient verbalizing understanding.

## 2025-04-19 PROBLEM — I50.32 HEART FAILURE WITH RECOVERED EJECTION FRACTION (HFRECEF): Status: ACTIVE | Noted: 2025-04-19

## 2025-04-19 NOTE — PROGRESS NOTES
[facility] Heart and Vascular Cardiology    Patient Name: Prosper Villafuerte  Patient : 1960    Scribe Attestation  By signing my name below, ICharlotte Scribe attest that this documentation has been prepared under the direction and in the presence of Christian Montano DO.    Scribe Attestation  By signing my name below, Beba ARITA Scribe attest that this documentation has been prepared under the direction and in the presence of Christian Montano DO.     Physician Attestation  Christian ARITA DO, personally performed the services described in the documentation as scribed by Beba Tidwell in my presence, and confirm it is both accurate and complete.    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  Patient was last evaluated by me in 2024 at which time I ordered blood work including CMP/lipid/magnesium/CBC, ordered additional blood work including BMP/BNP/magnesium be drawn in 6 months, and asked patient to follow-up in 6 months and sooner if necessary.  CMP done in 2024 showed normal serum sodium and potassium with a serum creatinine of 0.79, normal ALT/AST, serum magnesium was 1.90, CBC showed a hemoglobin of 14.5.  Lipid panel done in 2024 showed an LDL cholesterol of 65 and triglycerides of 94 while on atorvastatin 80 mg daily. BMP/BNP/magnesium labs were not completed. ECG done today showed sinus rhythm with a  heart rate of 82 bpm.  The patient reports that he has been feeling generally well from the cardiac standpoint.  He denies any new chest pain, shortness of breath, palpitations and lightheadedness.  He states that he takes all of his medications as prescribed.  He had a fall on 5/8/2025 sustaining a head injury and a laceration to his right eyebrow.  During my exam, he was resting comfortably on the exam table.            Assessment/Plan:   1. 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  Echocardiogram done on 10/31/23 showed low normal left ventricular systolic function with a 55% estimated ejection fraction.  He does not appear significantly volume overloaded on exam today.  He should continue his current cardiac medications.  Lab works as noted below will be completed now and again in 6 months prior to his next visit.   Echocardiogram will be completed in 6 months as noted below.   I discussed with him the importance of following a low-sodium heart healthy diet as well as weight loss.   Follow up in 6 months and sooner if necessary.      2. ICD  The patient has an ICD implanted in November 2021.  He should continue to follow with the device clinic.     3. Coronary artery disease  The patient has a history of moderate nonobstructive coronary artery disease as seen on cardiac catheterization done in June 2021.  ECG done today showed sinus rhythm with a heart rate of 82 bpm.  He denies anginal chest discomfort.  Blood pressure appears controlled on exam today.  He should continue his current antihypertensive medications and antiplatelet therapy.  Echocardiogram done on 10/31/23 showed low normal left ventricular systolic function with a 55% estimated ejection fraction.  Lipid panel done in November 2024 showed an LDL cholesterol of 65 and triglycerides of 94 while on atorvastatin 80 mg daily.   Lab works as noted below will be completed now and again in 6 months prior  to his next visit.   Echocardiogram will be completed in 6 months as noted below.   Please see lifestyle recommendations below.  Follow up in 6 months and sooner if necessary.      4. Hypertension  The patient has a history of hypertension which appears controlled on exam today.  He should continue his current antihypertensive medications and monitor his blood pressure at home.       5. Dyslipidemia  Lipid panel done in November 2024 showed an LDL cholesterol of 65 and triglycerides of 94 while on atorvastatin 80 mg daily.   Lipid panel will be updated in 6 months.   Please see lifestyle recommendations below.     6. Diabetes mellitus  Hemoglobin A1c done in May 2024 was 6.5%.   Medication management per primary care provider.      Orders:   BMP/BNP/magnesium,    CMP/lipid/magnesium/CBC in 6 months,   Echocardiogram in 6 months,   Follow-up in 6 months.    Lifestyle Recommendations  I recommend a whole-food plant-based diet, an eating pattern that encourages the consumption of unrefined plant foods (such as fruits, vegetables, tubers, whole grains, legumes, nuts and seeds) and discourages meats, dairy products, eggs and processed foods.     The AHA/ACC recommends that the patient consume a dietary pattern that emphasizes intake of vegetables, fruits, and whole grains; includes low-fat dairy products, poultry, fish, legumes, non-tropical vegetable oils, and nuts; and limits intake of sodium, sweets, sugar-sweetened beverages, and red meats.  Adapt this dietary pattern to appropriate calorie requirements (a 500-750 kcal/day deficit to loose weight), personal and cultural food preferences, and nutrition therapy for other medical conditions (including diabetes).  Achieve this pattern by following plans such as the [place] Mediterranean, DASH dietary pattern, or AHA diet.     Engage in 2 hours and 30 minutes per week of moderate-intensity physical activity, or 1 hour and 15 minutes (75 minutes) per week of  vigorous-intensity aerobic physical activity, or an equivalent combination of moderate and vigorous-intensity aerobic physical activity. Aerobic activity should be performed in episodes of at least 10 minutes preferably spread throughout the week.     Adhering to a heart healthy diet, regular exercise habits, avoidance of tobacco products, and maintenance of a healthy weight are crucial components of their heart disease risk reduction.     Any positive review of systems not specifically addressed in the office visit today should be evaluated and treated by the patients primary care physician or in an emergency department if necessary     Patient was notified that results from ordered tests will be called to the patient if it changes current management; it will otherwise be discussed at a future appointment and available on  [employer].     Thank you for allowing me to participate in the care of this patient.        This document was generated using the assistance of voice recognition software. If there are any errors of spelling, grammar, syntax, or meaning; please feel free to contact me directly for clarification.    Past Medical History:  He has no past medical history on file.    Past Surgical History:  He has a past surgical history that includes Other surgical history (06/11/2021).      Social History:  He reports that he has never smoked. He has never used smokeless tobacco. No history on file for alcohol use and drug use.    Family History:  Family History  Problem Relation Name Age of Onset    No Known Problems Mother      No Known Problems Father          Allergies:  Patient has no known allergies.    Outpatient Medications:  Current Outpatient Medications   Medication Instructions    aspirin 81 mg EC tablet 1 tablet, Daily    atorvastatin (Lipitor) 80 mg tablet 1 tablet, Daily    carvedilol (COREG) 6.25 mg, oral, 2 times daily (morning and late afternoon)    cetirizine (ZYRTEC) 10 mg, oral, Daily RT     "ciclopirox (Penlac) 8 % solution 1 Application, Daily    furosemide (Lasix) 40 mg tablet take 1/2 tablets (20 mg) by mouth once daily    glimepiride (AMARYL) 2 mg, Daily with breakfast    HYDROcodone-acetaminophen (Vicodin) 5-300 mg tablet 1-2 tablets    HYDROcodone-acetaminophen (Vicodin) 5-300 mg tablet 1 tablet, Every 8 hours    Jardiance 25 mg, Daily with breakfast    ketoconazole (NIZOral) 2 % cream 1 Application, 2 times daily    metFORMIN (GLUCOPHAGE) 500 mg, 2 times daily (morning and late afternoon)    omeprazole (PriLOSEC) 40 mg DR capsule 1 capsule, Daily    OneTouch Ultra Test strip use to test blood sugar twice a day as instructed    sacubitriL-valsartan (Entresto) 49-51 mg tablet 1 tablet, oral, 2 times daily    sildenafil (Viagra) 100 mg tablet 1 tablet, As needed    spironolactone (ALDACTONE) 25 mg, oral, Daily    terbinafine (LamISIL) 250 mg tablet 1 tablet, Daily    traZODone (Desyrel) 50 mg tablet 1 tablet, Nightly        ROS:  A 14 point review of systems was done and is negative other than as stated in HPI    Vitals:      1/10/2022     9:21 AM 3/18/2022    11:24 AM 11/4/2022     2:23 PM 5/31/2023     1:57 PM 11/3/2023    10:51 AM 5/3/2024    12:39 PM 11/8/2024    10:16 AM   Vitals   Systolic 120 124 111 132 88 138 142   Diastolic 60 64 71 78 62 72 78   BP Location       Left arm   Heart Rate 84 68 74 57 67 86 67   Resp 16 16 16       Height 1.803 m (5' 11\") 1.803 m (5' 11\") 1.803 m (5' 11\") 1.803 m (5' 11\") 1.803 m (5' 11\") 1.803 m (5' 11\") 1.803 m (5' 11\")   Weight (lb) 216.56 212 214 214 196 197 197   BMI 30.2 kg/m2 29.57 kg/m2 29.85 kg/m2 29.85 kg/m2 27.34 kg/m2 27.48 kg/m2 27.48 kg/m2   BSA (m2) 2.22 m2 2.2 m2 2.21 m2 2.21 m2 2.11 m2 2.12 m2 2.12 m2        Physical Exam:   Constitutional: Cooperative, in no acute distress, alert, appears stated age.  Skin: Skin color, texture, turgor normal. No rashes or lesions.  Head: Normocephalic. No masses, lesions, tenderness or abnormalities  Eyes: " Extraocular movements are grossly intact.  Mouth and throat: Mucous membranes moist  Neck: Neck supple, no carotid bruits, no JVD  Respiratory: Lungs clear to auscultation, no wheezing or rhonchi, no use of accessory muscles  Chest wall: ICD, normal excursion with respiration  Cardiovascular: Regular rhythm without murmur, gallop, or rubs  Gastrointestinal: Abdomen soft, nontender. Bowel sounds normal.  Musculoskeletal: Strength equal in upper extremities  Extremities: No pitting edema  Neurologic: Sensation grossly intact, alert and oriented x3    Intake/Output:   No intake/output data recorded.    Outpatient Medications  Current Outpatient Medications on File Prior to Visit   Medication Sig Dispense Refill    aspirin 81 mg EC tablet Take 1 tablet (81 mg) by mouth once daily.      atorvastatin (Lipitor) 80 mg tablet Take 1 tablet (80 mg) by mouth once daily.      carvedilol (Coreg) 6.25 mg tablet Take 1 tablet (6.25 mg) by mouth 2 times a day with meals. 180 tablet 3    cetirizine (ZyrTEC) 10 mg tablet Take 1 tablet (10 mg) by mouth once daily.      ciclopirox (Penlac) 8 % solution Apply 1 Application topically once daily. nails      furosemide (Lasix) 40 mg tablet take 1/2 tablets (20 mg) by mouth once daily 45 tablet 1    glimepiride (Amaryl) 2 mg tablet Take 1 tablet (2 mg) by mouth once daily with breakfast.      HYDROcodone-acetaminophen (Vicodin) 5-300 mg tablet Take 1-2 tablets by mouth. 4 TO 6 HOURS AS NEEDED FOR PAIN.      HYDROcodone-acetaminophen (Vicodin) 5-300 mg tablet Take 1 tablet by mouth every 8 hours.      Jardiance 25 mg Take 1 tablet (25 mg) by mouth once daily with breakfast.      ketoconazole (NIZOral) 2 % cream Apply 1 Application topically 2 times a day.      metFORMIN (Glucophage) 500 mg tablet Take 1 tablet (500 mg) by mouth 2 times daily (morning and late afternoon).      omeprazole (PriLOSEC) 40 mg DR capsule Take 1 capsule (40 mg) by mouth once daily.      OneTouch Ultra Test strip use  to test blood sugar twice a day as instructed      sacubitriL-valsartan (Entresto) 49-51 mg tablet Take 1 tablet by mouth 2 times a day. 180 tablet 1    sildenafil (Viagra) 100 mg tablet Take 1 tablet (100 mg) by mouth if needed. 30 TO 60 MINUTES BEFORE SEXUAL INTERCOURSE      spironolactone (Aldactone) 25 mg tablet TAKE ONE TABLET BY MOUTH DAILY 90 tablet 1    terbinafine (LamISIL) 250 mg tablet Take 1 tablet (250 mg) by mouth once daily.      traZODone (Desyrel) 50 mg tablet Take 1 tablet (50 mg) by mouth once daily at bedtime.       No current facility-administered medications on file prior to visit.       Labs: (past 26 weeks)  Recent Results (from the past 26 weeks)   Comprehensive Metabolic Panel    Collection Time: 11/08/24 10:45 AM   Result Value Ref Range    Glucose 141 (H) 74 - 99 mg/dL    Sodium 141 136 - 145 mmol/L    Potassium 4.2 3.5 - 5.3 mmol/L    Chloride 105 98 - 107 mmol/L    Bicarbonate 31 21 - 32 mmol/L    Anion Gap 9 (L) 10 - 20 mmol/L    Urea Nitrogen 22 6 - 23 mg/dL    Creatinine 0.79 0.50 - 1.30 mg/dL    eGFR >90 >60 mL/min/1.73m*2    Calcium 9.0 8.6 - 10.3 mg/dL    Albumin 4.2 3.4 - 5.0 g/dL    Alkaline Phosphatase 84 33 - 136 U/L    Total Protein 6.3 (L) 6.4 - 8.2 g/dL    AST 18 9 - 39 U/L    Bilirubin, Total 0.6 0.0 - 1.2 mg/dL    ALT 10 10 - 52 U/L   Lipid Panel    Collection Time: 11/08/24 10:45 AM   Result Value Ref Range    Cholesterol 121 0 - 199 mg/dL    HDL-Cholesterol 36.8 mg/dL    Cholesterol/HDL Ratio 3.3     LDL Calculated 65 <=99 mg/dL    VLDL 19 0 - 40 mg/dL    Triglycerides 94 0 - 149 mg/dL    Non HDL Cholesterol 84 0 - 149 mg/dL   Magnesium    Collection Time: 11/08/24 10:45 AM   Result Value Ref Range    Magnesium 1.90 1.60 - 2.40 mg/dL   CBC    Collection Time: 11/08/24 10:45 AM   Result Value Ref Range    WBC 10.6 4.4 - 11.3 x10*3/uL    nRBC 0.0 0.0 - 0.0 /100 WBCs    RBC 4.79 4.50 - 5.90 x10*6/uL    Hemoglobin 14.5 13.5 - 17.5 g/dL    Hematocrit 43.8 41.0 - 52.0 %     MCV 91 80 - 100 fL    MCH 30.3 26.0 - 34.0 pg    MCHC 33.1 32.0 - 36.0 g/dL    RDW 12.9 11.5 - 14.5 %    Platelets 333 150 - 450 x10*3/uL   POCT GLYCOSYLATED HEMOGLOBIN (HGB A1C)    Collection Time: 11/11/24 11:27 AM   Result Value Ref Range    POCT Hemoglobin A1C 6.5 (A) 4.3 - 5.6 %       ECG  Encounter Date: 11/08/24   ECG 12 Lead    Narrative    Sinus rhythm, T wave abnormality, heart rate 67 bpm.       Echocardiogram  No results found for this or any previous visit from the past 1095 days.      CV Studies:  EKG:  Encounter Date: 11/08/24   ECG 12 Lead    Narrative    Sinus rhythm, T wave abnormality, heart rate 67 bpm.     Echocardiogram:   Echocardiogram     [place] [address]  Phone [phone] Fax [phone]    TRANSTHORACIC ECHOCARDIOGRAM REPORT      Patient Name:     EMMETT ALTAMIRANO Reading Physician:   [id number] Johnson Oh MD  Study Date:       9/3/2021        Referring Physician: [id number] WADE  MRN/PID:          [id number]        PCP:  Accession/Order#: [id number]    Department Location: [facility]  YOB: 1960       Fellow:  Gender:           M               Nurse:  Admit Date:                       Sonographer:         Lucero Guillory RDCS  Admission Status: Outpatient      Additional Staff:  Height:           180.34 cm       CC Report to:  Weight:           95.71 kg        Study Type:          Echocardiogram  BSA:              2.16 m2  Blood Pressure: 112 /64 mmHg    Diagnosis/ICD: I25.10-Atherosclerotic heart disease of native coronary artery  without angina pectoris; I50.42-Chronic combined systolic  (congestive) and diastolic (congestive) heart failure (CHF);  I10-Essential (primary) hypertension  Indication:    Limited echo for LVF  Procedure/CPT: Echo Limited-73789; Doppler Limited-65656  Study Detail: The following Echo studies were performed: 2D and Doppler.      PHYSICIAN INTERPRETATION:  Left Ventricle:  The left ventricular systolic function is moderately decreased, with an estimated ejection fraction of 30-35%. There is global hypokinesis of the left ventricle with minor regional variations. The left ventricular cavity size is not assessed. Left ventricular diastolic filling was not assessed.  Left Atrium: The left atrium was not assessed.  Right Ventricle: The right ventricle was not assessed. Right ventricular systolic function not assessed.  Right Atrium: The right atrium was not assessed.  Aortic Valve: The aortic valve was not assessed. Aortic valve regurgitation was not assessed.  Mitral Valve: The mitral valve was not assessed. Mitral valve regurgitation was not assessed.  Tricuspid Valve: The tricuspid valve was not assessed. Tricuspid regurgitation was not assessed.  Pulmonic Valve: The pulmonic valve was not assessed. The pulmonic valve regurgitation was not assessed.  Pericardium: Pericardial effusion was not assessed.  Aorta: The aortic root was not assessed.      CONCLUSIONS:  1. The left ventricular systolic function is moderately decreased with a 30-35% estimated ejection fraction.  2. There is global hypokinesis of the left ventricle with minor regional variations.    QUANTITATIVE DATA SUMMARY:  2D MEASUREMENTS:  Normal Ranges:  IVSd:          1.20 cm    (0.6-1.1cm)  LVPWd:         1.20 cm    (0.6-1.1cm)  LVIDd:         6.00 cm    (3.9-5.9cm)  LVIDs:         5.20 cm  LV Mass Index: 145.6 g/m2  LV % FS        13.3 %    LA VOLUME:  Normal Ranges:  LA Vol A4C:        67.5 ml   (22+/-6mL/m2)  LA Vol Index A4C:  31.3ml/m2  LA Area A4C:       20.9 cm2  LA Major Axis A4C: 5.5 cm  LA Vol A4C:        68.0 ml    RA VOLUME BY A/L METHOD:  Normal Ranges:  RA Area A4C: 13.4 cm2    LV SYSTOLIC FUNCTION BY 2D PLANIMETRY (MOD):  Normal Ranges:  EF-A4C View: 35.5 % (>55%)  EF-A2C View: 36.4 %  EF-Biplane:  36.1 %    LV DIASTOLIC FUNCTION:  Normal Ranges:  MV Peak E:        0.51 m/s    (0.7-1.2 m/s)  MV Peak A:         0.90 m/s    (0.42-0.7 m/s)  E/A Ratio:        0.57        (1.0-2.2)  MV e'             0.06 m/s    (>8.0)  MV lateral e'     0.07 m/s  MV medial e'      0.05 m/s  MV A Dur:         124.00 msec  E/e' Ratio:       8.55        (<8.0)  LV IVRT:          106 msec    (<100ms)  PulmV A Revs Dur: 132.00 msec    MITRAL VALVE:  Normal Ranges:  MV DT: 203 msec (150-240msec)    AORTIC VALVE:  Normal Ranges:  LVOT Max Jorge:  0.71 m/s (<1.1m/s)  LVOT VTI:      16.10 cm  LVOT Diameter: 2.20 cm  (1.8-2.4cm)    RIGHT VENTRICLE:  RV 1 3.2 cm  RV 2 1.7 cm  RV 3 9.0 cm    TRICUSPID VALVE/RVSP:  Normal Ranges:  IVC Diam: 1.48 cm    Pulmonary Veins:  PulmV A Revs Dur: 132.00 msec      [id number] Johnson Oh MD  Electronically signed on 9/4/2021 at 8:58:48 AM         Final     Stress Testing IMGRESULT(NRZ2560:1:1825):   NM CARDIAC STRESS REST (MYOCARDIAL PERFUSION MIBI) 05/31/2021    Narrative  MRN: [id number]  Patient Name: EMMETT ALTAMIRANO    STUDY:  CARDIAC STRESS/REST INJECTION; PART 2 STRESS OR REST (NO CHARGE);  CARDIAC STRESS/REST (MYOCARDIAL PERFUSION/MIBI);  5/31/2021 11:36 am    INDICATION:  elevated troponin/SOB.    COMPARISON:  None.    ACCESSION NUMBER(S):  [id number]; [id number]; [id number]    ORDERING CLINICIAN:  DOUG WADE    TECHNIQUE:  DIVISION OF NUCLEAR MEDICINE  PHARMACOLOGIC STRESS MYOCARDIAL PERFUSION SCAN, ONE DAY PROTOCOL    The patient received an intravenous dose of  11.8 mCi of Tc-99m  tetrofosmin and resting emission tomographic (SPECT) images of the  myocardium were acquired. The patient then received an intravenous  infusion of 0.4mg regadenoson (Lexiscan) followed by an additional  dose of  33.4 mCi of Tc-99m tetrofosmin. Stress phase SPECT images of  the myocardium were then acquired. These included ECG-gated images to  assess and quantify ventricular function.    FINDINGS:  There is a small fixed defect in the anteroseptal and apical  segments. No evidence of ischemia.    Calculated ejection fraction of 27% with  global hypokinesis and  akinesis in the distal anterior, apical, septal, and inferior walls.    Impression  1. There is a small fixed defect in the anteroseptal and apical  segments. There is a low probability of ischemia  2. Calculated ejection fraction 27% with global hypokinesis and  akinesis in the distal anterior, apical, septal, and inferior walls.    Cardiac Catheterization:   Adult Cath     [facility] [place], Cath Lab  [address] [id number]  Phone [phone] Fax [phone]    Cardiovascular Catheterization Report    Patient Name:     EMMETT ALTAMIRANO Performing Physician: [id number] Walt Gallego MD  Study Date:       6/1/2021        Verifying Physician:  [id number] Walt Gallego MD  MRN/PID:          [id number]        Cardiologist:  Accession/Order#: [id number]       Referring Physician:  [id number] DOUG WADE  YOB: 1960       Referring Physician:  Gender:           M               Referring Physician:      Study:            Left and Right Heart Catheterization  Additional Study: IVUS - Intravascular Ultrasound  Additional Study: FFR - Fractional Flow Reserve      Indications:  EMMETT ALTAMIRANO is a 61 year old male who presents with hypertension, dyslipidemia and diabetes. Cardiomyopathy, with a chest pain assessment of atypical angina. Study performed as an urgent cath procedure. Heart failure.    Medical History:  Stress test performed: Yes. Stress test type: Stress Nuclear. Stress result: Negative. CTA performed: No. [place] accessed: No. LVEF Assessed: Yes. LVEF = 25%.    Procedure Description:  After infiltration with 2% Lidocaine, the right radial artery was cannulated with a modified Seldinger technique. Subsequently a 6 Gabonese sheath was placed retrograde in the right radial artery. Selective coronary catheterization was performed using a 6 Fr catheter(s) exchanged over a guide wire to cannulate the coronary arteries. A 6 tip catheter was used for left  coronary injections. A 6 tip catheter was used for right coronary injections.  Additional catheter(s) used to visualize the coronary arteries were: Marisa. Multiple injections of contrast were made into the left and right coronary arteries with angiograms recorded in multiple projections. Cardiac output was calculated via the Mi method. After completion of the procedure, the arterial sheath was pulled and a TR Band Radial Compression Device was utilized to obtain patent hemostasis.    Coronary Angiography:  The coronary circulation is right dominant.    Coronary Angiography Comments:  Given the evidence of borderline significant ostial LCx lesion, I proceeded with IVUS to evaluate for lesion significant before dedication to LM stenting. IVUS came back showing no evidence of significant ostial disease with a diameter of 3.0mm at the ostium and area of 11mm2. Thus, iFR was used for confirmation that showed evidence of negative ischemia with values of 0.93 repeated 3 times for confirmation, thus no PCI was performed.      Left Main Coronary Artery:  The left main coronary artery is a normal caliber vessel. The left main arises normally from the left coronary sinus of Valsalva and bifurcates into the LAD and circumflex coronary arteries. The left main coronary artery showed no significant disease or stenosis greater than 30%.    Left Anterior Descending Coronary Artery Distribution:  The left anterior descending coronary artery is a normal caliber vessel. The LAD arises normally from the left main coronary artery. The LAD demonstrated no significant disease or stenosis greater than 30%.    Circumflex Coronary Artery Distribution:  The circumflex coronary artery is a normal caliber vessel. The circumflex arises normally from the left main coronary artery and terminates in the AV groove. The circumflex revealed moderate atherosclerotic disease. The ostial circumflex coronary artery showed 50% stenosis. This lesion was  eccentric. Negative iFR of ostial LCx (repeated 3 times) at 0.93 and IVUS showed area of 11mm2 with a max diameter of 3.0mm at the ostium.    Right Heart Catheterization:  Cardiac output was calculated via the Mi method. Normal filling pressures. Normal ventricular filling pressure. Cardiac output is normal. Preserved cardiac output at rest. No pulmonary vascular resistance. Normal cardiac output without evidence of volume overload and normal right sided filling pressures, mildly elevated left sided filling pressures.    Right Coronary Artery Distribution:    The right coronary artery is a normal caliber vessel. The RCA arises normally from the right sinus of Valsalva. The RCA showed no significant disease or stenosis greater than 30%. The mid right coronary artery showed 40% stenosis.    Coronary Lesion Summary:  Vessel       Stenosis   Vessel Segment  Circumflex 50% stenosis     ostial  RCA        40% stenosis      mid          Complications:  No in-lab complications observed.    Cardiac Cath Transition of Care Summary:  Post Procedure Diagnosis: Mild CAD.  Findings:                 Non obstructive CAD with negative IVUS and RFR for  ischemia.  Blood Loss:               Estimated blood loss during the procedure was 0 mls.  Specimens Removed:        Number of specimen(s) removed: none.      Recommendations:  Management of HF per primary team.    ____________________________________________________________________________________  CONCLUSIONS:  1. Negative iFR of ostial LCx (repeated 3 times) at 0.93 and IVUS showed area of 11mm2 with a max diameter of 3.0mm at the ostium.  2. Evidence of normal CI/CO and normal right sided filling pressures with mildly elevated left sided filling pressures.  3. Continue optimal therapy for NICM including adequate blood pressure control.  4. No indication for revascularization at the current  time.    ____________________________________________________________________________________  CPT Codes:  Right & Left Heart Cath w/ventriculography/Coronary angio (RHC)([facility])-33058; Moderate Sedation Services initial 15 minutes patient >5 years-73988; Moderate Sedation Services 1st additional 15 minutes patient >5 years-02497; IVUS, Left Circumflex initial Vessel (PCI)-52263.LC; FFR, initial Vessel (PCI)-78910    ICD 10 Codes:  I42.0-Dilated cardiomyopathy    [id number] Walt Gallego MD  Performing Physician  Electronically signed by [id number] Walt Gallego MD on 6/1/2021 at 12:20:39 PM      cc Report to: [id number] CHRISTIAN MONTANO           Final   No results found for this or any previous visit from the past 3650 days.     Cardiac Scoring: No results found for this or any previous visit from the past 1825 days.    AAA : No results found for this or any previous visit from the past 1825 days.    OTHER: No results found for this or any previous visit from the past 1825 days.    LAST IMAGING RESULTS  ECG 12 Lead  Sinus rhythm, T wave abnormality, heart rate 67 bpm.      Problem List Items Addressed This Visit       CAD in native artery    Diabetes mellitus (Multi)    Dyslipidemia    HTN (hypertension), benign    ICD (implantable cardioverter-defibrillator) in place    Heart failure with recovered ejection fraction (HFrecEF) - Primary          Christian Montano DO, FACC, FACOI

## 2025-05-09 ENCOUNTER — APPOINTMENT (OUTPATIENT)
Dept: RADIOLOGY | Facility: HOSPITAL | Age: 65
End: 2025-05-09
Payer: COMMERCIAL

## 2025-05-09 ENCOUNTER — HOSPITAL ENCOUNTER (EMERGENCY)
Facility: HOSPITAL | Age: 65
Discharge: HOME | End: 2025-05-09
Attending: EMERGENCY MEDICINE
Payer: COMMERCIAL

## 2025-05-09 VITALS
DIASTOLIC BLOOD PRESSURE: 89 MMHG | HEART RATE: 81 BPM | TEMPERATURE: 98.4 F | RESPIRATION RATE: 17 BRPM | BODY MASS INDEX: 26.92 KG/M2 | SYSTOLIC BLOOD PRESSURE: 176 MMHG | OXYGEN SATURATION: 98 % | WEIGHT: 188 LBS | HEIGHT: 70 IN

## 2025-05-09 DIAGNOSIS — S01.111A LACERATION OF RIGHT EYEBROW, INITIAL ENCOUNTER: ICD-10-CM

## 2025-05-09 DIAGNOSIS — S09.90XA CLOSED HEAD INJURY, INITIAL ENCOUNTER: Primary | ICD-10-CM

## 2025-05-09 PROCEDURE — 71046 X-RAY EXAM CHEST 2 VIEWS: CPT

## 2025-05-09 PROCEDURE — 90471 IMMUNIZATION ADMIN: CPT | Performed by: EMERGENCY MEDICINE

## 2025-05-09 PROCEDURE — 70450 CT HEAD/BRAIN W/O DYE: CPT | Performed by: RADIOLOGY

## 2025-05-09 PROCEDURE — 2500000004 HC RX 250 GENERAL PHARMACY W/ HCPCS (ALT 636 FOR OP/ED)

## 2025-05-09 PROCEDURE — 99284 EMERGENCY DEPT VISIT MOD MDM: CPT | Mod: 25 | Performed by: EMERGENCY MEDICINE

## 2025-05-09 PROCEDURE — 90715 TDAP VACCINE 7 YRS/> IM: CPT | Mod: JZ | Performed by: EMERGENCY MEDICINE

## 2025-05-09 PROCEDURE — 70450 CT HEAD/BRAIN W/O DYE: CPT

## 2025-05-09 PROCEDURE — 12011 RPR F/E/E/N/L/M 2.5 CM/<: CPT | Performed by: EMERGENCY MEDICINE

## 2025-05-09 PROCEDURE — 71046 X-RAY EXAM CHEST 2 VIEWS: CPT | Performed by: STUDENT IN AN ORGANIZED HEALTH CARE EDUCATION/TRAINING PROGRAM

## 2025-05-09 PROCEDURE — 2500000004 HC RX 250 GENERAL PHARMACY W/ HCPCS (ALT 636 FOR OP/ED): Mod: JZ | Performed by: EMERGENCY MEDICINE

## 2025-05-09 RX ORDER — LIDOCAINE HYDROCHLORIDE AND EPINEPHRINE 10; 10 UG/ML; MG/ML
5 INJECTION, SOLUTION INFILTRATION; PERINEURAL ONCE
Status: COMPLETED | OUTPATIENT
Start: 2025-05-09 | End: 2025-05-09

## 2025-05-09 RX ORDER — LIDOCAINE HYDROCHLORIDE AND EPINEPHRINE 10; 10 UG/ML; MG/ML
INJECTION, SOLUTION INFILTRATION; PERINEURAL
Status: COMPLETED
Start: 2025-05-09 | End: 2025-05-09

## 2025-05-09 RX ADMIN — LIDOCAINE HYDROCHLORIDE,EPINEPHRINE BITARTRATE 5 ML: 10; .01 INJECTION, SOLUTION INFILTRATION; PERINEURAL at 03:17

## 2025-05-09 RX ADMIN — LIDOCAINE HYDROCHLORIDE AND EPINEPHRINE 5 ML: 10; 10 INJECTION, SOLUTION INFILTRATION; PERINEURAL at 03:17

## 2025-05-09 RX ADMIN — TETANUS TOXOID, REDUCED DIPHTHERIA TOXOID AND ACELLULAR PERTUSSIS VACCINE, ADSORBED 0.5 ML: 5; 2.5; 8; 8; 2.5 SUSPENSION INTRAMUSCULAR at 01:10

## 2025-05-09 ASSESSMENT — COLUMBIA-SUICIDE SEVERITY RATING SCALE - C-SSRS
1. IN THE PAST MONTH, HAVE YOU WISHED YOU WERE DEAD OR WISHED YOU COULD GO TO SLEEP AND NOT WAKE UP?: NO
2. HAVE YOU ACTUALLY HAD ANY THOUGHTS OF KILLING YOURSELF?: NO
6. HAVE YOU EVER DONE ANYTHING, STARTED TO DO ANYTHING, OR PREPARED TO DO ANYTHING TO END YOUR LIFE?: NO

## 2025-05-09 ASSESSMENT — LIFESTYLE VARIABLES
HAVE YOU EVER FELT YOU SHOULD CUT DOWN ON YOUR DRINKING: NO
HAVE PEOPLE ANNOYED YOU BY CRITICIZING YOUR DRINKING: NO
EVER FELT BAD OR GUILTY ABOUT YOUR DRINKING: NO
EVER HAD A DRINK FIRST THING IN THE MORNING TO STEADY YOUR NERVES TO GET RID OF A HANGOVER: NO
TOTAL SCORE: 0

## 2025-05-09 ASSESSMENT — PAIN DESCRIPTION - LOCATION: LOCATION: HEAD

## 2025-05-09 ASSESSMENT — PAIN - FUNCTIONAL ASSESSMENT: PAIN_FUNCTIONAL_ASSESSMENT: 0-10

## 2025-05-09 ASSESSMENT — PAIN SCALES - GENERAL: PAINLEVEL_OUTOF10: 7

## 2025-05-09 NOTE — ED PROVIDER NOTES
HPI   Chief Complaint   Patient presents with    Head Injury     Fell while walking up the steps. Denies LOC, no thinners. 1.5in lac to above right eye brow. Bleeding controlled    Fall     Fell a couple days ago as well while walking in parking lot at [employer]. Then again tonight while walkin up the stairs       Patient presents to the emergency department secondary to a fall.  Patient was carrying packages into the house and fell forward.  He hit his head.  He has a laceration to the medial aspect of the right eyebrow.  Mild headache.  No neck pain.  Patient also fell recently because of the loss of balance.  He has some mild left-sided rib pain that has been present since that fall.  No abdominal pain.  No nausea or vomiting.  No change in bowel movements.  No extremity pain.  No numbness or tingling.  Patient is not on blood thinning medications.  He is unsure when his last tetanus shot was.                          Lalita Coma Scale Score: 15                  Patient History   Medical History[1]  Surgical History[2]  Family History[3]  Social History[4]    Physical Exam   ED Triage Vitals [05/09/25 0028]   Temperature Heart Rate Respirations BP   36.9 °C (98.4 °F) 84 16 (!) 184/92      Pulse Ox Temp Source Heart Rate Source Patient Position   97 % Temporal Monitor Lying      BP Location FiO2 (%)     Left arm --       Physical Exam  Vitals and nursing note reviewed.   Constitutional:       Appearance: He is not ill-appearing.   HENT:      Head:      Comments: Patient has evidence of a 2.5 cm laceration to the medial aspect of the right eyebrow.  No active bleeding.     Right Ear: Tympanic membrane normal.      Left Ear: Tympanic membrane normal.      Nose: Nose normal.      Mouth/Throat:      Mouth: Mucous membranes are moist.   Eyes:      Extraocular Movements: Extraocular movements intact.      Pupils: Pupils are equal, round, and reactive to light.   Cardiovascular:      Rate and Rhythm: Normal rate and  regular rhythm.      Pulses: Normal pulses.      Heart sounds: Normal heart sounds.   Pulmonary:      Effort: Pulmonary effort is normal.      Breath sounds: Normal breath sounds.      Comments: Mild left-sided chest tenderness.  No crepitus.  Chest:      Chest wall: Tenderness present.   Abdominal:      General: Abdomen is flat. Bowel sounds are normal.      Palpations: Abdomen is soft.      Tenderness: There is no abdominal tenderness. There is no guarding or rebound.   Musculoskeletal:         General: No tenderness or deformity. Normal range of motion.      Cervical back: Normal range of motion and neck supple. No tenderness.   Skin:     General: Skin is warm and dry.      Capillary Refill: Capillary refill takes less than 2 seconds.      Comments: 2.5 cm laceration to the medial aspect of the right eyebrow.   Neurological:      General: No focal deficit present.      Mental Status: He is alert and oriented to person, place, and time.   Psychiatric:         Mood and Affect: Mood normal.         Behavior: Behavior normal.       Labs Reviewed - No data to display  Pain Management Panel           No data to display              CT head wo IV contrast    (Results Pending)   XR chest 2 views    (Results Pending)     ED Course & MDM   Diagnoses as of 05/09/25 0308   Closed head injury, initial encounter   Laceration of right eyebrow, initial encounter       Medical Decision Making  Patient presents secondary to a fall.  Patient is evaluated for intracranial trauma with CT head.  Chest x-ray 2 views is obtained to evaluate for rib fracture or pneumothorax.  Patient's tetanus is updated with [employer].  Patient's head CT is negative for acute intracranial hemorrhage.  Chest x-ray is negative for fracture or pneumothorax.  Patient's laceration was sutured.  Patient tolerated this well.  Patient is stable for discharge.  Patient is to follow-up with his primary care physician in 5 days for evaluation for suture removal.   Patient is understanding of this plan of care and agreeable with discharge.        Procedure  Laceration Repair    Performed by: Ruth Ann Weaver MD  Authorized by: Ruth Ann Weaver MD    Consent:     Consent obtained:  Verbal  Universal protocol:     Patient identity confirmed:  Verbally with patient  Laceration details:     Location:  Face    Face location:  R eyebrow    Length (cm):  2.5  Pre-procedure details:     Preparation:  Patient was prepped and draped in usual sterile fashion  Exploration:     Hemostasis achieved with:  Epinephrine  Treatment:     Area cleansed with:  Saline    Amount of cleaning:  Standard    Debridement:  None  Skin repair:     Repair method:  Sutures    Suture size:  6-0    Suture material:  Nylon    Suture technique:  Simple interrupted    Number of sutures:  9  Approximation:     Approximation:  Close  Repair type:     Repair type:  Simple  Post-procedure details:     Dressing:  Open (no dressing)         [1] No past medical history on file.  [2]   Past Surgical History:  Procedure Laterality Date    OTHER SURGICAL HISTORY  06/11/2021    Knee replacement   [3]   Family History  Problem Relation Name Age of Onset    No Known Problems Mother      No Known Problems Father     [4]   Social History  Tobacco Use    Smoking status: Never    Smokeless tobacco: Never   Substance Use Topics    Alcohol use: Not on file    Drug use: Not on file        Ruth Ann Weaver MD  05/09/25 0310

## 2025-05-12 ENCOUNTER — APPOINTMENT (OUTPATIENT)
Dept: CARDIOLOGY | Facility: HOSPITAL | Age: 65
End: 2025-05-12
Payer: COMMERCIAL

## 2025-05-12 VITALS
HEART RATE: 82 BPM | BODY MASS INDEX: 27.58 KG/M2 | HEIGHT: 71 IN | WEIGHT: 197 LBS | SYSTOLIC BLOOD PRESSURE: 128 MMHG | DIASTOLIC BLOOD PRESSURE: 78 MMHG

## 2025-05-12 DIAGNOSIS — I25.10 CAD IN NATIVE ARTERY: ICD-10-CM

## 2025-05-12 DIAGNOSIS — E08.00 DIABETES MELLITUS DUE TO UNDERLYING CONDITION WITH HYPEROSMOLARITY WITHOUT COMA, WITHOUT LONG-TERM CURRENT USE OF INSULIN: ICD-10-CM

## 2025-05-12 DIAGNOSIS — E78.5 DYSLIPIDEMIA: ICD-10-CM

## 2025-05-12 DIAGNOSIS — I50.32 HEART FAILURE WITH RECOVERED EJECTION FRACTION (HFRECEF): Primary | ICD-10-CM

## 2025-05-12 DIAGNOSIS — I10 HTN (HYPERTENSION), BENIGN: ICD-10-CM

## 2025-05-12 DIAGNOSIS — Z95.810 ICD (IMPLANTABLE CARDIOVERTER-DEFIBRILLATOR) IN PLACE: ICD-10-CM

## 2025-05-12 LAB
ATRIAL RATE: 82 BPM
P AXIS: 56 DEGREES
P OFFSET: 196 MS
P ONSET: 138 MS
PR INTERVAL: 166 MS
Q ONSET: 221 MS
QRS COUNT: 13 BEATS
QRS DURATION: 104 MS
QT INTERVAL: 388 MS
QTC CALCULATION(BAZETT): 453 MS
QTC FREDERICIA: 430 MS
R AXIS: -20 DEGREES
T AXIS: 4 DEGREES
T OFFSET: 415 MS
VENTRICULAR RATE: 82 BPM

## 2025-05-12 PROCEDURE — 99214 OFFICE O/P EST MOD 30 MIN: CPT | Performed by: INTERNAL MEDICINE

## 2025-05-12 PROCEDURE — 93005 ELECTROCARDIOGRAM TRACING: CPT | Performed by: INTERNAL MEDICINE

## 2025-05-12 PROCEDURE — 3078F DIAST BP <80 MM HG: CPT | Performed by: INTERNAL MEDICINE

## 2025-05-12 PROCEDURE — 1159F MED LIST DOCD IN RCRD: CPT | Performed by: INTERNAL MEDICINE

## 2025-05-12 PROCEDURE — 99214 OFFICE O/P EST MOD 30 MIN: CPT | Mod: 25 | Performed by: INTERNAL MEDICINE

## 2025-05-12 PROCEDURE — 3008F BODY MASS INDEX DOCD: CPT | Performed by: INTERNAL MEDICINE

## 2025-05-12 PROCEDURE — 3074F SYST BP LT 130 MM HG: CPT | Performed by: INTERNAL MEDICINE

## 2025-05-12 PROCEDURE — 1036F TOBACCO NON-USER: CPT | Performed by: INTERNAL MEDICINE

## 2025-05-12 PROCEDURE — 93010 ELECTROCARDIOGRAM REPORT: CPT | Performed by: INTERNAL MEDICINE

## 2025-05-18 DIAGNOSIS — I10 HTN (HYPERTENSION), BENIGN: ICD-10-CM

## 2025-05-18 DIAGNOSIS — I50.42 CHRONIC COMBINED SYSTOLIC AND DIASTOLIC HEART FAILURE, NYHA CLASS 3: ICD-10-CM

## 2025-05-18 DIAGNOSIS — E08.00 DIABETES MELLITUS DUE TO UNDERLYING CONDITION WITH HYPEROSMOLARITY WITHOUT COMA, WITHOUT LONG-TERM CURRENT USE OF INSULIN: ICD-10-CM

## 2025-05-18 DIAGNOSIS — Z86.79 HISTORY OF CARDIOMYOPATHY: ICD-10-CM

## 2025-05-18 DIAGNOSIS — Z95.810 ICD (IMPLANTABLE CARDIOVERTER-DEFIBRILLATOR) IN PLACE: ICD-10-CM

## 2025-05-18 DIAGNOSIS — I25.10 CAD IN NATIVE ARTERY: ICD-10-CM

## 2025-05-18 DIAGNOSIS — R53.83 OTHER FATIGUE: ICD-10-CM

## 2025-05-18 DIAGNOSIS — E78.5 DYSLIPIDEMIA: ICD-10-CM

## 2025-05-20 RX ORDER — CARVEDILOL 6.25 MG/1
6.25 TABLET ORAL
Qty: 180 TABLET | Refills: 3 | Status: SHIPPED | OUTPATIENT
Start: 2025-05-20

## 2025-05-21 ENCOUNTER — HOSPITAL ENCOUNTER (OUTPATIENT)
Dept: CARDIOLOGY | Facility: HOSPITAL | Age: 65
Discharge: HOME | End: 2025-05-21
Payer: COMMERCIAL

## 2025-05-21 DIAGNOSIS — I50.42 CHRONIC COMBINED SYSTOLIC (CONGESTIVE) AND DIASTOLIC (CONGESTIVE) HEART FAILURE: ICD-10-CM

## 2025-05-21 DIAGNOSIS — I50.1 LEFT VENTRICULAR FAILURE, UNSPECIFIED (MULTI): ICD-10-CM

## 2025-05-21 DIAGNOSIS — Z95.810 ICD (IMPLANTABLE CARDIOVERTER-DEFIBRILLATOR) IN PLACE: Primary | ICD-10-CM

## 2025-05-21 PROCEDURE — 93296 REM INTERROG EVL PM/IDS: CPT

## 2025-08-20 DIAGNOSIS — I50.42 CHRONIC COMBINED SYSTOLIC AND DIASTOLIC HEART FAILURE, NYHA CLASS 3: ICD-10-CM

## 2025-08-22 LAB
ANION GAP SERPL CALCULATED.4IONS-SCNC: 10 MMOL/L (CALC) (ref 7–17)
BNP SERPL-MCNC: 22 PG/ML
BUN SERPL-MCNC: 22 MG/DL (ref 7–25)
BUN/CREAT SERPL: ABNORMAL (CALC) (ref 6–22)
CALCIUM SERPL-MCNC: 9.7 MG/DL (ref 8.6–10.3)
CHLORIDE SERPL-SCNC: 103 MMOL/L (ref 98–110)
CO2 SERPL-SCNC: 27 MMOL/L (ref 20–32)
CREAT SERPL-MCNC: 0.72 MG/DL (ref 0.7–1.35)
EGFRCR SERPLBLD CKD-EPI 2021: 101 ML/MIN/1.73M2
GLUCOSE SERPL-MCNC: 132 MG/DL (ref 65–99)
MAGNESIUM SERPL-MCNC: 2 MG/DL (ref 1.5–2.5)
POTASSIUM SERPL-SCNC: 4.2 MMOL/L (ref 3.5–5.3)
SODIUM SERPL-SCNC: 140 MMOL/L (ref 135–146)

## 2025-08-22 RX ORDER — SACUBITRIL AND VALSARTAN 49; 51 MG/1; MG/1
1 TABLET ORAL 2 TIMES DAILY
Qty: 60 TABLET | Refills: 0 | Status: SHIPPED | OUTPATIENT
Start: 2025-08-22

## 2025-08-28 ENCOUNTER — HOSPITAL ENCOUNTER (OUTPATIENT)
Dept: CARDIOLOGY | Facility: HOSPITAL | Age: 65
Discharge: HOME | End: 2025-08-28
Payer: COMMERCIAL

## 2025-08-28 DIAGNOSIS — Z95.810 ICD (IMPLANTABLE CARDIOVERTER-DEFIBRILLATOR) IN PLACE: Primary | ICD-10-CM

## 2025-08-28 DIAGNOSIS — Z95.810 ICD (IMPLANTABLE CARDIOVERTER-DEFIBRILLATOR) IN PLACE: ICD-10-CM

## 2025-08-28 DIAGNOSIS — I50.42 CHRONIC COMBINED SYSTOLIC (CONGESTIVE) AND DIASTOLIC (CONGESTIVE) HEART FAILURE: ICD-10-CM

## 2025-08-28 DIAGNOSIS — I50.1 LEFT VENTRICULAR FAILURE, UNSPECIFIED (MULTI): ICD-10-CM

## 2025-08-28 PROCEDURE — 93282 PRGRMG EVAL IMPLANTABLE DFB: CPT

## 2025-09-03 DIAGNOSIS — I50.42 CHRONIC COMBINED SYSTOLIC AND DIASTOLIC HEART FAILURE, NYHA CLASS 3: ICD-10-CM

## 2025-09-03 RX ORDER — FUROSEMIDE 40 MG/1
20 TABLET ORAL DAILY
Qty: 45 TABLET | Refills: 1 | Status: SHIPPED | OUTPATIENT
Start: 2025-09-03